# Patient Record
Sex: FEMALE | Race: WHITE | NOT HISPANIC OR LATINO | Employment: FULL TIME | URBAN - METROPOLITAN AREA
[De-identification: names, ages, dates, MRNs, and addresses within clinical notes are randomized per-mention and may not be internally consistent; named-entity substitution may affect disease eponyms.]

---

## 2017-02-27 ENCOUNTER — OFFICE VISIT (OUTPATIENT)
Dept: OBSTETRICS AND GYNECOLOGY | Facility: CLINIC | Age: 52
End: 2017-02-27
Payer: MEDICAID

## 2017-02-27 VITALS
WEIGHT: 216.5 LBS | SYSTOLIC BLOOD PRESSURE: 114 MMHG | DIASTOLIC BLOOD PRESSURE: 78 MMHG | HEIGHT: 64 IN | BODY MASS INDEX: 36.96 KG/M2

## 2017-02-27 DIAGNOSIS — Z00.00 PREVENTATIVE HEALTH CARE: Primary | ICD-10-CM

## 2017-02-27 DIAGNOSIS — Z01.419 VISIT FOR GYNECOLOGIC EXAMINATION: ICD-10-CM

## 2017-02-27 PROCEDURE — 99213 OFFICE O/P EST LOW 20 MIN: CPT | Mod: PBBFAC | Performed by: NURSE PRACTITIONER

## 2017-02-27 PROCEDURE — 88175 CYTOPATH C/V AUTO FLUID REDO: CPT

## 2017-02-27 PROCEDURE — 99999 PR PBB SHADOW E&M-EST. PATIENT-LVL III: CPT | Mod: PBBFAC,,, | Performed by: NURSE PRACTITIONER

## 2017-02-27 PROCEDURE — 99203 OFFICE O/P NEW LOW 30 MIN: CPT | Mod: S$PBB,,, | Performed by: NURSE PRACTITIONER

## 2017-02-27 RX ORDER — LISINOPRIL 20 MG/1
TABLET ORAL
COMMUNITY
Start: 2013-09-04 | End: 2018-09-07

## 2017-02-27 RX ORDER — QUETIAPINE FUMARATE 400 MG/1
50 TABLET, FILM COATED ORAL NIGHTLY
COMMUNITY
End: 2017-11-01 | Stop reason: DRUGHIGH

## 2017-02-27 RX ORDER — SIMVASTATIN 20 MG/1
TABLET, FILM COATED ORAL
COMMUNITY
Start: 2014-11-10 | End: 2017-07-12 | Stop reason: CLARIF

## 2017-02-27 NOTE — PROGRESS NOTES
"CC: Well woman exam    Thiago Rincon is a 51 y.o. female  presents for well woman exam.  LMP: Patient's last menstrual period was 2017 (exact date)..  No issues, problems, or complaints.Can not recall last pap exam. Cycles are every 26- 28 days and not heavy. Last mammogram this month, normal.     Past Medical History:   Diagnosis Date    Abnormal Pap smear of cervix     Depression     History of suicidal tendencies     Mood disorder     Psoriasis      Past Surgical History:   Procedure Laterality Date    CERVICAL CONIZATION   W/ LASER       Social History     Social History    Marital status:      Spouse name: N/A    Number of children: N/A    Years of education: N/A     Occupational History    Not on file.     Social History Main Topics    Smoking status: Current Every Day Smoker     Packs/day: 0.50     Types: Cigarettes    Smokeless tobacco: Never Used    Alcohol use Yes      Comment: chico d/t pt status    Drug use: No    Sexual activity: No     Other Topics Concern    Not on file     Social History Narrative     Family History   Problem Relation Age of Onset    Hypertension Father     Colon cancer Mother     Hypertension Mother     Breast cancer Neg Hx     Cancer Neg Hx     Ovarian cancer Neg Hx      OB History      Para Term  AB TAB SAB Ectopic Multiple Living    0 0 0 0 0 0 0 0 0 0          /78  Ht 5' 4" (1.626 m)  Wt 98.2 kg (216 lb 7.9 oz)  LMP 2017 (Exact Date)  BMI 37.16 kg/m2      ROS:  GENERAL: Denies weight gain or weight loss. Feeling well overall.   SKIN: Denies rash or lesions.   HEAD: Denies head injury or headache.   NODES: Denies enlarged lymph nodes.   CHEST: Denies chest pain or shortness of breath.   CARDIOVASCULAR: Denies palpitations or left sided chest pain.   ABDOMEN: No abdominal pain, constipation, diarrhea, nausea, vomiting or rectal bleeding.   URINARY: No frequency, dysuria, hematuria, or burning on " "urination.  REPRODUCTIVE: See HPI.   BREASTS: The patient performs breast self-examination and denies pain, lumps, or nipple discharge.   HEMATOLOGIC: No easy bruisability or excessive bleeding.   MUSCULOSKELETAL: Denies joint pain or swelling.   NEUROLOGIC: Denies syncope or weakness.   PSYCHIATRIC: Denies depression, anxiety or mood swings.    PHYSICAL EXAM:  APPEARANCE: Obese female, in no acute distress.  AFFECT: WNL, alert and oriented x 3  SKIN: No acne or hirsutism  NECK: Neck symmetric without masses or thyromegaly  NODES: No inguinal, cervical, axillary, or femoral lymph node enlargement  CHEST: Good respiratory effect  ABDOMEN: Soft.  No tenderness or masses.  No hepatosplenomegaly.  No hernias.  BREASTS: Symmetrical, no skin changes or visible lesions.  No palpable masses, nipple discharge bilaterally.  PELVIC: Normal external genitalia without lesions.  Normal hair distribution.  Adequate perineal body, normal urethral meatus.  Vagina moist and well rugated without lesions or discharge.  Cervix pink, without lesions, discharge or tenderness.  No significant cystocele or rectocele.  Bimanual exam shows uterus to be normal size, regular, mobile and nontender.  Adnexa without masses or tenderness.    EXTREMITIES: No edema.    1. Preventative health care  Liquid-based pap smear, screening   2. Visit for gynecologic examination  Liquid-based pap smear, screening    PLAN:  Pap exam  Wants to make " her own appt for colonoscopy ".   Patient was counseled today on A.C.S. Pap guidelines and recommendations for yearly pelvic exams, mammograms and monthly self breast exams; to see her PCP for other health maintenance.                   "

## 2017-03-06 ENCOUNTER — TELEPHONE (OUTPATIENT)
Dept: OBSTETRICS AND GYNECOLOGY | Facility: CLINIC | Age: 52
End: 2017-03-06

## 2017-03-06 NOTE — TELEPHONE ENCOUNTER
----- Message from Sylvia Durant NP sent at 3/6/2017  1:25 PM CST -----  Please call patient and inform normal pap exam results.

## 2017-07-12 ENCOUNTER — OFFICE VISIT (OUTPATIENT)
Dept: PODIATRY | Facility: CLINIC | Age: 52
End: 2017-07-12
Payer: COMMERCIAL

## 2017-07-12 VITALS
WEIGHT: 216 LBS | DIASTOLIC BLOOD PRESSURE: 81 MMHG | HEIGHT: 64 IN | SYSTOLIC BLOOD PRESSURE: 125 MMHG | HEART RATE: 81 BPM | BODY MASS INDEX: 36.88 KG/M2

## 2017-07-12 DIAGNOSIS — L60.0 INGROWN NAIL: Primary | ICD-10-CM

## 2017-07-12 DIAGNOSIS — E66.9 OBESITY, UNSPECIFIED OBESITY SEVERITY, UNSPECIFIED OBESITY TYPE: ICD-10-CM

## 2017-07-12 PROCEDURE — 99203 OFFICE O/P NEW LOW 30 MIN: CPT | Mod: S$GLB,,, | Performed by: PODIATRIST

## 2017-07-12 PROCEDURE — 99999 PR PBB SHADOW E&M-EST. PATIENT-LVL III: CPT | Mod: PBBFAC,,, | Performed by: PODIATRIST

## 2017-07-12 RX ORDER — SIMVASTATIN 10 MG/1
TABLET, FILM COATED ORAL NIGHTLY
COMMUNITY
End: 2017-11-01 | Stop reason: DRUGHIGH

## 2017-07-12 RX ORDER — AMOXICILLIN 500 MG/1
500 CAPSULE ORAL
COMMUNITY
End: 2017-08-14

## 2017-07-12 NOTE — PATIENT INSTRUCTIONS
Instructions for Care after Ingrown Nail removal    Dressing Options- Traditional Method:  1. Soaking two times a day in WARM water with Epsom salts or diluted Povidone Iodine  (Betadine) for 15-20 minutes. You will need to purchase these products from the pharmacy.    2. Dry toe then apply an antibiotic cream or ointment such as Neosporin or Polysporin plus or  Garamycin and cover with a 2 x 2 inch size gauze and then secure with a 1 inch band aid.    3. In the second week, take the dressing off at bedtime to air dry the toe.    4. If the toe is infected take the Antibiotic Pills as directed until finished.      Understanding Ingrown Toenails    An ingrown nail is the result of a nail growing into the skin that surrounds it. This often occurs at either edge of the big toe. Ingrown nails may be caused by improper trimming, inherited nail deformities, injuries, fungal infections, or pressure.  Symptoms  Ingrown nails may cause pain at the tip of the toe or all the way to the base of the toe. The pain is often worse while walking. An ingrown nail may also lead to infection, inflammation, or a more serious condition. If its infected, you might see pus or redness.  Evaluation  To determine the extent of your problem, your healthcare provider examines and possibly presses the painful area. If other problems are suspected, blood tests, cultures, and X-rays may be done as well.  Treatment  If the nail isnt infected, your healthcare provider may trim the corner of it to help relieve your symptoms. He or she may need to remove one side of your nail back to the cuticle. The base of the nail may then be treated with a chemical to keep the ingrown part from growing back. Severe infections or ingrown nails may require antibiotics and temporary or permanent removal of a portion of the nail. To prevent pain, a local anesthetic may be used in these procedures. This treatment is usually done at your healthcare provider's  office.  Prevention  Many nail problems can be prevented by wearing the right shoes and trimming your nails properly. To help avoid infection, keep your feet clean and dry. If you have diabetes, talk with your healthcare provider before doing any foot self-care.  · The right shoes: Get your feet measured (your size may change as you age). Wear shoes that are supportive and roomy enough for your toes to wiggle. Look for shoes made of natural materials such as leather, which allow your feet to breathe.  · Proper trimming: To avoid problems, trim your toenails straight across without cutting down into the corners. If you cant trim your own nails, ask your healthcare provider to do so for you.  Date Last Reviewed: 10/1/2016  © 8261-9298 [employer]. [address] [place] [address], [place] [address]. All rights reserved. This information is not intended as a substitute for professional medical care. Always follow your healthcare professional's instructions.

## 2017-07-12 NOTE — PROGRESS NOTES
Subjective:    Patient ID: Thiago Rincon is a 51 y.o. female.    Chief Complaint: Ingrown Toenail (left great toe / dr duron 6/17)      HPI:   Thiago is a 51 y.o. female who presents to the clinic complaining of painful ingrown toenail on the left foot.  HPI    Last Podiatry Enc: Visit date not found  Last Enc w/ Me: Visit date not found    Past Medical History:   Diagnosis Date    Abnormal Pap smear of cervix     Depression     History of suicidal tendencies     Mood disorder     Psoriasis      Past Surgical History:   Procedure Laterality Date    CERVICAL CONIZATION   W/ LASER  2007     Social History     Social History    Marital status:      Spouse name: N/A    Number of children: N/A    Years of education: N/A     Occupational History    Not on file.     Social History Main Topics    Smoking status: Current Every Day Smoker     Packs/day: 0.50     Types: Cigarettes    Smokeless tobacco: Never Used    Alcohol use Yes      Comment: chico d/t pt status    Drug use: No    Sexual activity: No     Other Topics Concern    Not on file     Social History Narrative    No narrative on file         Current Outpatient Prescriptions:     lisinopril (PRINIVIL,ZESTRIL) 20 MG tablet, TAKE 1 TABLET BY MOUTH EVERY DAY, Disp: , Rfl:     lithium carbonate 150 MG capsule, Take 300 mg by mouth 3 (three) times daily with meals. , Disp: , Rfl:     quetiapine (SEROQUEL) 400 MG tablet, Take 50 mg by mouth every evening., Disp: , Rfl:     simvastatin (ZOCOR) 20 MG tablet, , Disp: , Rfl:    Review of patient's allergies indicates:   Allergen Reactions    Lamictal [lamotrigine] Itching    Benadryl [diphenhydramine hcl] Rash       ROS  ROS Constitutional:  General Appearance: well nourished  Vascular: negative for cramps, edema and bruising  Musculoskeletal: negative for joint paint and joint edema  Skin: negative for rashes and lesions  Neurological: negative for burning, tingling and numbness  Gastrointestinal:  "negative for stomach pain, nausea and vomiting        Objective:        /81   Pulse 81   Ht 5' 4" (1.626 m)   Wt 98 kg (216 lb)   BMI 37.08 kg/m²     Ortho/SPM Exam  Physical Exam  LE exam con't:  V: DP 2/4, PT 2/4, CRT< 3s to all digits tested.     N: SILT in SP/DP/T/Michaela/Saph distributions.    Derm: Skin intact. No erythema, edema or ecchymosis. L hallux nail mildly ingrowing and incurvated. No signs of infection.    Ortho:  +Motor EHL/FHL/TA/GA   Compartments soft/compressible. No pain on passive stretch of big toe. No calf  pain.        Assessment:     Imaging / Labs:    No results found.            1. Ingrown nail - Left Foot    2. Obesity, unspecified obesity severity, unspecified obesity type          Plan:          Procedures  .   Thiago was seen today for ingrown toenail.    Diagnoses and all orders for this visit:    Ingrown nail - Left Foot    Obesity, unspecified obesity severity, unspecified obesity type        Thiago Rincon is a 51 y.o. female presenting w/   1. Ingrown nail - Left Foot    2. Obesity, unspecified obesity severity, unspecified obesity type        -pt seen, evaluated, and managed  -dx discussed in detail. All questions/concerns addressed  -all tx options discussed. All alternatives, risks, benefits of all txs discussed  -Pt was educated on weight loss  -rxs dispensed: none  -discussed ingrowing toenails and all tx options. Pt opts for non-procedural slantback which was performed as a courtesy w/o complication  -pt to perform epsom salt or betadine soaks once daily x 2wks. Written instructions dispensed  -keep toe covered with triple abx ointment + bandaid x 2wks  -will plan for procedural removal at nxt visit or if ingrown nails recur    rtc 4 wks for possible procedure: PNA w/o matrixectomy L hallux      Return in about 4 weeks (around 8/9/2017).          "

## 2017-07-19 ENCOUNTER — PATIENT MESSAGE (OUTPATIENT)
Dept: PODIATRY | Facility: CLINIC | Age: 52
End: 2017-07-19

## 2017-07-20 ENCOUNTER — TELEPHONE (OUTPATIENT)
Dept: PODIATRY | Facility: CLINIC | Age: 52
End: 2017-07-20

## 2017-07-20 NOTE — TELEPHONE ENCOUNTER
I spoke with ashkan velásquez , to let her know to continue soaking for another week and if she is having any problems to please call me back I gave her my name

## 2017-08-14 ENCOUNTER — OFFICE VISIT (OUTPATIENT)
Dept: PODIATRY | Facility: CLINIC | Age: 52
End: 2017-08-14
Payer: COMMERCIAL

## 2017-08-14 VITALS
HEIGHT: 64 IN | BODY MASS INDEX: 36.88 KG/M2 | DIASTOLIC BLOOD PRESSURE: 86 MMHG | WEIGHT: 216 LBS | SYSTOLIC BLOOD PRESSURE: 133 MMHG | HEART RATE: 78 BPM

## 2017-08-14 DIAGNOSIS — L60.0 INGROWN NAIL: Primary | ICD-10-CM

## 2017-08-14 PROCEDURE — 99999 PR PBB SHADOW E&M-EST. PATIENT-LVL III: CPT | Mod: PBBFAC,,, | Performed by: PODIATRIST

## 2017-08-14 PROCEDURE — 99213 OFFICE O/P EST LOW 20 MIN: CPT | Mod: S$GLB,,, | Performed by: PODIATRIST

## 2017-08-14 PROCEDURE — 3008F BODY MASS INDEX DOCD: CPT | Mod: S$GLB,,, | Performed by: PODIATRIST

## 2017-08-14 NOTE — PROGRESS NOTES
Subjective:    Patient ID: Thiago Rincon is a 51 y.o. female.    Chief Complaint: Ingrown Toenail (left great toenail/ dr duron 6- 17)      HPI:   Thiago is a 51 y.o. female who presents to the clinic complaining of painful ingrown toenail on the left foot.  Pt is 4 wks s/p slantback for ingrowing nail. Reports problem is resolved.        Last Podiatry Enc: Visit date not found  Last Enc w/ Me: Visit date not found    Past Medical History:   Diagnosis Date    Abnormal Pap smear of cervix     Depression     History of suicidal tendencies     Mood disorder     Psoriasis      Past Surgical History:   Procedure Laterality Date    CERVICAL CONIZATION   W/ LASER  2007     Social History     Social History    Marital status:      Spouse name: N/A    Number of children: N/A    Years of education: N/A     Occupational History    Not on file.     Social History Main Topics    Smoking status: Current Every Day Smoker     Packs/day: 0.50     Types: Cigarettes    Smokeless tobacco: Never Used    Alcohol use Yes      Comment: chico d/t pt status    Drug use: No    Sexual activity: No     Other Topics Concern    Not on file     Social History Narrative    No narrative on file         Current Outpatient Prescriptions:     lisinopril (PRINIVIL,ZESTRIL) 20 MG tablet, TAKE 1 TABLET BY MOUTH EVERY DAY, Disp: , Rfl:     lithium carbonate 150 MG capsule, Take 300 mg by mouth 3 (three) times daily with meals. , Disp: , Rfl:     quetiapine (SEROQUEL) 400 MG tablet, Take 50 mg by mouth every evening., Disp: , Rfl:     simvastatin (ZOCOR) 10 MG tablet, Take by mouth every evening., Disp: , Rfl:    Review of patient's allergies indicates:   Allergen Reactions    Lamictal [lamotrigine] Itching    Benadryl [diphenhydramine hcl] Rash       ROS  ROS Constitutional:  General Appearance: well nourished  Vascular: negative for cramps, edema and bruising  Musculoskeletal: negative for joint paint and joint edema  Skin:  "negative for rashes and lesions  Neurological: negative for burning, tingling and numbness  Gastrointestinal: negative for stomach pain, nausea and vomiting        Objective:        /86   Pulse 78   Ht 5' 4" (1.626 m)   Wt 98 kg (216 lb)   BMI 37.08 kg/m²     Ortho/SPM Exam  Physical Exam  LE exam con't:  V: DP 2/4, PT 2/4, CRT< 3s to all digits tested.     N: SILT in SP/DP/T/Michaela/Saph distributions.    Derm: Skin intact. No erythema, edema or ecchymosis. L hallux nail mildly ingrowing and incurvated. No signs of infection.    Ortho:  +Motor EHL/FHL/TA/GA   Compartments soft/compressible. No pain on passive stretch of big toe. No calf  pain.        Assessment:     Imaging / Labs:    No results found.            1. Ingrown nail          Plan:          Procedures  .   Thiago was seen today for ingrown toenail.    Diagnoses and all orders for this visit:    Ingrown nail        Thiago Rincon is a 51 y.o. female presenting w/   1. Ingrown nail        -pt seen, evaluated, and managed  -dx discussed in detail. All questions/concerns addressed  -all tx options discussed. All alternatives, risks, benefits of all txs discussed  -Pt was educated on weight loss  -rxs dispensed: none  -discussed ingrowing toenails and all tx options.   -discussed prevention  -problem resolved at this time  -will plan for procedural removal at nxt visit or if ingrown nails recur      Return in about 2 weeks (around 8/28/2017).          "

## 2017-08-14 NOTE — PATIENT INSTRUCTIONS
Instructions for Care after Ingrown Nail removal    Dressing Options- Traditional Method:  1. Soaking two times a day in WARM water with Epsom salts or diluted Povidone Iodine  (Betadine) for 15-20 minutes. You will need to purchase these products from the pharmacy.    2. Dry toe then apply an antibiotic cream or ointment such as Neosporin or Polysporin plus or  Garamycin and cover with a 2 x 2 inch size gauze and then secure with a 1 inch band aid.    3. In the second week, take the dressing off at bedtime to air dry the toe.    4. If the toe is infected take the Antibiotic Pills as directed until finished.      Understanding Ingrown Toenails    An ingrown nail is the result of a nail growing into the skin that surrounds it. This often occurs at either edge of the big toe. Ingrown nails may be caused by improper trimming, inherited nail deformities, injuries, fungal infections, or pressure.  Symptoms  Ingrown nails may cause pain at the tip of the toe or all the way to the base of the toe. The pain is often worse while walking. An ingrown nail may also lead to infection, inflammation, or a more serious condition. If its infected, you might see pus or redness.  Evaluation  To determine the extent of your problem, your healthcare provider examines and possibly presses the painful area. If other problems are suspected, blood tests, cultures, and X-rays may be done as well.  Treatment  If the nail isnt infected, your healthcare provider may trim the corner of it to help relieve your symptoms. He or she may need to remove one side of your nail back to the cuticle. The base of the nail may then be treated with a chemical to keep the ingrown part from growing back. Severe infections or ingrown nails may require antibiotics and temporary or permanent removal of a portion of the nail. To prevent pain, a local anesthetic may be used in these procedures. This treatment is usually done at your healthcare provider's  office.  Prevention  Many nail problems can be prevented by wearing the right shoes and trimming your nails properly. To help avoid infection, keep your feet clean and dry. If you have diabetes, talk with your healthcare provider before doing any foot self-care.  · The right shoes: Get your feet measured (your size may change as you age). Wear shoes that are supportive and roomy enough for your toes to wiggle. Look for shoes made of natural materials such as leather, which allow your feet to breathe.  · Proper trimming: To avoid problems, trim your toenails straight across without cutting down into the corners. If you cant trim your own nails, ask your healthcare provider to do so for you.  Date Last Reviewed: 10/1/2016  © 4844-9287 [employer], [facility]. [address] [place] [address], [place] [address]. All rights reserved. This information is not intended as a substitute for professional medical care. Always follow your healthcare professional's instructions.

## 2017-11-01 ENCOUNTER — LAB VISIT (OUTPATIENT)
Dept: LAB | Facility: HOSPITAL | Age: 52
End: 2017-11-01
Attending: INTERNAL MEDICINE
Payer: COMMERCIAL

## 2017-11-01 ENCOUNTER — OFFICE VISIT (OUTPATIENT)
Dept: INTERNAL MEDICINE | Facility: CLINIC | Age: 52
End: 2017-11-01
Payer: COMMERCIAL

## 2017-11-01 ENCOUNTER — PATIENT MESSAGE (OUTPATIENT)
Dept: INTERNAL MEDICINE | Facility: CLINIC | Age: 52
End: 2017-11-01

## 2017-11-01 VITALS
BODY MASS INDEX: 36.96 KG/M2 | HEART RATE: 80 BPM | DIASTOLIC BLOOD PRESSURE: 78 MMHG | RESPIRATION RATE: 16 BRPM | HEIGHT: 64 IN | SYSTOLIC BLOOD PRESSURE: 116 MMHG | WEIGHT: 216.5 LBS | TEMPERATURE: 98 F

## 2017-11-01 DIAGNOSIS — E78.2 MIXED HYPERLIPIDEMIA: ICD-10-CM

## 2017-11-01 DIAGNOSIS — Z00.00 ANNUAL PHYSICAL EXAM: Primary | ICD-10-CM

## 2017-11-01 DIAGNOSIS — Z12.11 COLON CANCER SCREENING: ICD-10-CM

## 2017-11-01 DIAGNOSIS — Z00.00 ANNUAL PHYSICAL EXAM: ICD-10-CM

## 2017-11-01 DIAGNOSIS — L40.9 PSORIASIS: ICD-10-CM

## 2017-11-01 DIAGNOSIS — I10 ESSENTIAL HYPERTENSION: ICD-10-CM

## 2017-11-01 DIAGNOSIS — H53.9 VISUAL CHANGES: ICD-10-CM

## 2017-11-01 DIAGNOSIS — Z72.0 TOBACCO ABUSE: ICD-10-CM

## 2017-11-01 DIAGNOSIS — H66.006 RECURRENT ACUTE SUPPURATIVE OTITIS MEDIA WITHOUT SPONTANEOUS RUPTURE OF TYMPANIC MEMBRANE OF BOTH SIDES: ICD-10-CM

## 2017-11-01 DIAGNOSIS — F31.9 BIPOLAR DEPRESSION: ICD-10-CM

## 2017-11-01 PROBLEM — H66.003 ACUTE SUPPURATIVE OTITIS MEDIA OF BOTH EARS WITHOUT SPONTANEOUS RUPTURE OF TYMPANIC MEMBRANES: Status: ACTIVE | Noted: 2017-11-01

## 2017-11-01 LAB
ALBUMIN SERPL BCP-MCNC: 3.6 G/DL
ALP SERPL-CCNC: 70 U/L
ALT SERPL W/O P-5'-P-CCNC: 12 U/L
ANION GAP SERPL CALC-SCNC: 7 MMOL/L
AST SERPL-CCNC: 19 U/L
BASOPHILS # BLD AUTO: 0.03 K/UL
BASOPHILS NFR BLD: 0.4 %
BILIRUB SERPL-MCNC: 0.3 MG/DL
BUN SERPL-MCNC: 15 MG/DL
CALCIUM SERPL-MCNC: 9.1 MG/DL
CHLORIDE SERPL-SCNC: 103 MMOL/L
CHOLEST SERPL-MCNC: 206 MG/DL
CHOLEST/HDLC SERPL: 3.7 {RATIO}
CO2 SERPL-SCNC: 26 MMOL/L
CREAT SERPL-MCNC: 0.8 MG/DL
DIFFERENTIAL METHOD: NORMAL
EOSINOPHIL # BLD AUTO: 0.2 K/UL
EOSINOPHIL NFR BLD: 3 %
ERYTHROCYTE [DISTWIDTH] IN BLOOD BY AUTOMATED COUNT: 13.2 %
EST. GFR  (AFRICAN AMERICAN): >60 ML/MIN/1.73 M^2
EST. GFR  (NON AFRICAN AMERICAN): >60 ML/MIN/1.73 M^2
ESTIMATED AVG GLUCOSE: 105 MG/DL
GLUCOSE SERPL-MCNC: 96 MG/DL
HBA1C MFR BLD HPLC: 5.3 %
HCT VFR BLD AUTO: 39.7 %
HDLC SERPL-MCNC: 56 MG/DL
HDLC SERPL: 27.2 %
HGB BLD-MCNC: 12.7 G/DL
IMM GRANULOCYTES # BLD AUTO: 0 K/UL
IMM GRANULOCYTES NFR BLD AUTO: 0 %
LDLC SERPL CALC-MCNC: 134 MG/DL
LYMPHOCYTES # BLD AUTO: 2.5 K/UL
LYMPHOCYTES NFR BLD: 34.8 %
MCH RBC QN AUTO: 29.1 PG
MCHC RBC AUTO-ENTMCNC: 32 G/DL
MCV RBC AUTO: 91 FL
MONOCYTES # BLD AUTO: 0.6 K/UL
MONOCYTES NFR BLD: 7.9 %
NEUTROPHILS # BLD AUTO: 3.9 K/UL
NEUTROPHILS NFR BLD: 53.9 %
NONHDLC SERPL-MCNC: 150 MG/DL
NRBC BLD-RTO: 0 /100 WBC
PLATELET # BLD AUTO: 264 K/UL
PMV BLD AUTO: 11.3 FL
POTASSIUM SERPL-SCNC: 4.5 MMOL/L
PROT SERPL-MCNC: 7.5 G/DL
RBC # BLD AUTO: 4.37 M/UL
SODIUM SERPL-SCNC: 136 MMOL/L
TRIGL SERPL-MCNC: 80 MG/DL
TSH SERPL DL<=0.005 MIU/L-ACNC: 1.5 UIU/ML
WBC # BLD AUTO: 7.25 K/UL

## 2017-11-01 PROCEDURE — 90471 IMMUNIZATION ADMIN: CPT | Mod: S$GLB,,, | Performed by: INTERNAL MEDICINE

## 2017-11-01 PROCEDURE — 99386 PREV VISIT NEW AGE 40-64: CPT | Mod: 25,S$GLB,, | Performed by: INTERNAL MEDICINE

## 2017-11-01 PROCEDURE — 80061 LIPID PANEL: CPT

## 2017-11-01 PROCEDURE — 84443 ASSAY THYROID STIM HORMONE: CPT

## 2017-11-01 PROCEDURE — 80053 COMPREHEN METABOLIC PANEL: CPT

## 2017-11-01 PROCEDURE — 85025 COMPLETE CBC W/AUTO DIFF WBC: CPT

## 2017-11-01 PROCEDURE — 99999 PR PBB SHADOW E&M-EST. PATIENT-LVL V: CPT | Mod: PBBFAC,,, | Performed by: INTERNAL MEDICINE

## 2017-11-01 PROCEDURE — 36415 COLL VENOUS BLD VENIPUNCTURE: CPT | Mod: PO

## 2017-11-01 PROCEDURE — 83036 HEMOGLOBIN GLYCOSYLATED A1C: CPT

## 2017-11-01 PROCEDURE — 90732 PPSV23 VACC 2 YRS+ SUBQ/IM: CPT | Mod: S$GLB,,, | Performed by: INTERNAL MEDICINE

## 2017-11-01 RX ORDER — SIMVASTATIN 20 MG/1
20 TABLET, FILM COATED ORAL DAILY
Refills: 5 | COMMUNITY
Start: 2017-10-19 | End: 2022-01-18 | Stop reason: ALTCHOICE

## 2017-11-01 RX ORDER — QUETIAPINE FUMARATE 50 MG/1
50 TABLET, FILM COATED ORAL NIGHTLY
Refills: 5 | Status: ON HOLD | COMMUNITY
Start: 2017-10-19 | End: 2018-10-17

## 2017-11-01 RX ORDER — LITHIUM CARBONATE 300 MG
300 TABLET ORAL DAILY
Refills: 5 | Status: ON HOLD | COMMUNITY
Start: 2017-10-19 | End: 2018-10-25 | Stop reason: HOSPADM

## 2017-11-01 NOTE — PROGRESS NOTES
Subjective:       Patient ID: Thiago Rincon is a 51 y.o. female.    Chief Complaint: Annual Exam and Establish Care    HPI  51 y.o. female here for annual exam.     Lipid disorders/ASCVD risk (ages >/= 45 or >/= 20 if increased risk ): due    DM (>45y yearly or if obese, HTN): A1c due  Sexual Screening: not sexually active  Eye exam: due  Breast Cancer (40-50y discretion of pt, 50-74y every 1-2 years): Mammogram 03/2017 - normal   Cervical Cancer (Pap Smear ages 21-65 every 3 years or Pap + HPV q5 years after 30 years of age):  03/2017 - normal  Colorectal Cancer (normal risk 50-75yr): Colonoscopy due     Vaccines:   Influenza (yearly) utd   Tetanus (every 10 yrs - 1st tdap) pt unsure but thinks utd    PPSV23(>66yo or <65 w/ lung dz, smoking, DM) due         Pt presents today requesting lab work. She follows with uncle who is a Family Medicine Physician in [place], but was unable to obtain lab work there. She wishes to have her thyroid checked as problems run in her family. She is utd on cervical cancer screening and mammogram. 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 She follows with a Psychiatrist in [place] for her history of bipolar depression.     Past Medical History:   Diagnosis Date    Abnormal Pap smear of cervix     Depression     History of suicidal tendencies     Mood disorder     Psoriasis      Past Surgical History:   Procedure Laterality Date    CERVICAL CONIZATION   W/ LASER  2007     Family History   Problem Relation Age of Onset    Hypertension Father     Colon cancer Mother     Hypertension Mother     Colon cancer Maternal Aunt     Breast cancer Neg Hx     Cancer Neg Hx     Ovarian cancer Neg Hx      Social History     Social History    Marital status:      Spouse name: N/A    Number of children: N/A    Years of education: N/A     Occupational History    Not on file.     Social History Main Topics    Smoking status: Current Every Day  Smoker     Packs/day: 0.25     Years: 5.00     Types: Cigarettes    Smokeless tobacco: Never Used    Alcohol use Yes      Comment: one glass per year    Drug use: No    Sexual activity: No     Other Topics Concern    Not on file     Social History Narrative    RT at [employer]. Sees family med in [place]. Psychiatrist in [place].     Review of patient's allergies indicates:   Allergen Reactions    Lamictal [lamotrigine] Itching    Benadryl [diphenhydramine hcl] Rash       Current Outpatient Prescriptions:     lisinopril (PRINIVIL,ZESTRIL) 20 MG tablet, TAKE 1 TABLET BY MOUTH EVERY DAY, Disp: , Rfl:     lithium (LITHOTAB) 300 mg tablet, Take 300 mg by mouth once daily., Disp: , Rfl: 5    QUEtiapine (SEROQUEL) 50 MG tablet, Take 50 mg by mouth nightly., Disp: , Rfl: 5    simvastatin (ZOCOR) 20 MG tablet, Take 20 mg by mouth once daily., Disp: , Rfl: 5          Review of Systems   Constitutional: Negative for fatigue and unexpected weight change.   HENT: Positive for ear pain. Negative for sinus pain and trouble swallowing.    Eyes: Positive for visual disturbance. Negative for discharge and redness.   Respiratory: Negative for cough and shortness of breath.    Cardiovascular: Negative for chest pain and leg swelling.   Gastrointestinal: Negative for abdominal pain, blood in stool, constipation and diarrhea.   Genitourinary: Negative for difficulty urinating, dysuria and frequency.   Musculoskeletal: Negative for gait problem and joint swelling.   Skin: Positive for rash (psoriasis). Negative for pallor and wound.   Neurological: Positive for dizziness. Negative for numbness and headaches.       Objective:        Vitals:    11/01/17 0815   BP: 116/78   Pulse: 80   Resp: 16   Temp: 98.4 °F (36.9 °C)     Body mass index is 37.16 kg/m².    Physical Exam   Constitutional: She is oriented to person, place, and time. She appears well-developed. No distress.   HENT:   Head: Normocephalic and atraumatic.   Right Ear:  Tympanic membrane normal.   Left Ear: Tympanic membrane normal.   Nose: Nose normal.   Mouth/Throat: Oropharynx is clear and moist.   Psoriasis on bilateral external ears   Eyes: Conjunctivae and EOM are normal. Pupils are equal, round, and reactive to light.   Neck: Normal range of motion. Neck supple. No tracheal deviation present. No thyromegaly present.   Cardiovascular: Normal rate, regular rhythm, normal heart sounds and intact distal pulses.    Pulmonary/Chest: Effort normal and breath sounds normal.   Abdominal: Soft. She exhibits no distension and no mass. There is no tenderness.   Musculoskeletal: Normal range of motion. She exhibits no edema.   Lymphadenopathy:     She has no cervical adenopathy.   Neurological: She is alert and oriented to person, place, and time. No sensory deficit.   Skin: Skin is warm and dry. Rash (psoriasis along bilateral shins) noted. She is not diaphoretic. No cyanosis. Nails show no clubbing.   Psychiatric: She has a normal mood and affect. Her behavior is normal. Judgment normal. Her speech is tangential. She is inattentive.       Assessment:     1. Annual physical exam    2. Psoriasis    3. Recurrent acute suppurative otitis media without spontaneous rupture of tympanic membrane of both sides    4. Visual changes    5. Essential hypertension    6. Mixed hyperlipidemia    7. Bipolar depression    8. Colon cancer screening    9. Tobacco abuse           Plan:         1. Annual physical exam  - PPSV23 today. Influenza vaccine utd.  - CBC auto differential; Future  - Comprehensive metabolic panel; Future  - Hemoglobin A1c; Future  - Lipid panel; Future  - TSH; Future  - Urinalysis; Future  - Ambulatory Referral to Optometry  - Case request GI: COLONOSCOPY    2. Psoriasis  - continue current emolliants. Pt not interested in seeing dermatology    3. Recurrent acute suppurative otitis media without spontaneous rupture of tympanic membrane of both sides  - Ambulatory Referral to  ENT    4. Visual changes  - Ambulatory Referral to Optometry    5. Essential hypertension  - continue lisinopril 20mg daily    6. Mixed hyperlipidemia  - continue simvastatin    7. Bipolar depression  - continue management per psychiatry    8. Colon cancer screening  - Case request GI: COLONOSCOPY    9. Tobacco abuse  - Pneumococcal Polysaccharide Vaccine (23 Valent) (SQ/IM)  - encouraged cessation

## 2017-12-26 ENCOUNTER — HOSPITAL ENCOUNTER (OUTPATIENT)
Dept: PREADMISSION TESTING | Facility: HOSPITAL | Age: 52
Discharge: HOME | End: 2017-12-26
Attending: INTERNAL MEDICINE
Payer: COMMERCIAL

## 2017-12-26 ENCOUNTER — ANESTHESIA EVENT (OUTPATIENT)
Dept: ENDOSCOPY | Facility: HOSPITAL | Age: 52
End: 2017-12-26
Payer: COMMERCIAL

## 2017-12-26 VITALS — WEIGHT: 215 LBS | HEIGHT: 64 IN | BODY MASS INDEX: 36.7 KG/M2

## 2017-12-26 DIAGNOSIS — Z12.11 COLON CANCER SCREENING: Primary | ICD-10-CM

## 2017-12-26 RX ORDER — CEPHALEXIN 500 MG/1
500 CAPSULE ORAL 4 TIMES DAILY
Status: ON HOLD | COMMUNITY
End: 2018-10-17

## 2017-12-26 RX ORDER — SODIUM, POTASSIUM,MAG SULFATES 17.5-3.13G
1 SOLUTION, RECONSTITUTED, ORAL ORAL ONCE
Qty: 2 BOTTLE | Refills: 0 | Status: ON HOLD | OUTPATIENT
Start: 2017-12-27 | End: 2017-12-28 | Stop reason: HOSPADM

## 2017-12-26 RX ORDER — BISACODYL 5 MG
20 TABLET, DELAYED RELEASE (ENTERIC COATED) ORAL ONCE
Qty: 4 TABLET | Refills: 0 | Status: ON HOLD | OUTPATIENT
Start: 2017-12-27 | End: 2017-12-28 | Stop reason: HOSPADM

## 2017-12-26 NOTE — ANESTHESIA PREPROCEDURE EVALUATION
12/26/2017  Thiago Rincon is a 52 y.o., female.    Anesthesia Evaluation    I have reviewed the Patient Summary Reports.    I have reviewed the Nursing Notes.   I have reviewed the Medications.     Review of Systems  Anesthesia Hx:  No problems with previous Anesthesia    Social:  No Alcohol Use, Smoker    Hematology/Oncology:  Hematology Normal   Oncology Normal     EENT/Dental:EENT/Dental Normal   Cardiovascular:   Exercise tolerance: good Hypertension, well controlled    Pulmonary:  Pulmonary Normal    Renal/:  Renal/ Normal     Hepatic/GI:  Hepatic/GI Normal    Musculoskeletal:  Musculoskeletal Normal    Neurological:  Neurology Normal    Endocrine:  Endocrine Normal    Dermatological:  Skin Normal    Psych:   Psychiatric History          Physical Exam  General:  Obesity    Airway/Jaw/Neck:  Airway Findings: Mouth Opening: Normal Tongue: Normal  General Airway Assessment: Adult  Mallampati: II  TM Distance: Normal, at least 6 cm      Dental:  Dental Findings: In tact             Anesthesia Plan  Type of Anesthesia, risks & benefits discussed:  Anesthesia Type:  general  Patient's Preference:   Intra-op Monitoring Plan: standard ASA monitors  Intra-op Monitoring Plan Comments:   Post Op Pain Control Plan:   Post Op Pain Control Plan Comments:   Induction:   IV  Beta Blocker:  Patient is not currently on a Beta-Blocker (No further documentation required).       Informed Consent: Patient understands risks and agrees with Anesthesia plan.  Questions answered. Anesthesia consent signed with patient.  ASA Score: 2     Day of Surgery Review of History & Physical: I have interviewed and examined the patient. I have reviewed the patient's H&P dated: 12/28/17. There are no significant changes.  H&P update referred to the surgeon.         Ready For Surgery From Anesthesia Perspective.

## 2017-12-28 ENCOUNTER — TELEPHONE (OUTPATIENT)
Dept: SURGERY | Facility: CLINIC | Age: 52
End: 2017-12-28

## 2017-12-28 ENCOUNTER — HOSPITAL ENCOUNTER (OUTPATIENT)
Facility: HOSPITAL | Age: 52
Discharge: HOME | End: 2017-12-28
Attending: COLON & RECTAL SURGERY | Admitting: COLON & RECTAL SURGERY
Payer: COMMERCIAL

## 2017-12-28 ENCOUNTER — ANESTHESIA (OUTPATIENT)
Dept: ENDOSCOPY | Facility: HOSPITAL | Age: 52
End: 2017-12-28
Payer: COMMERCIAL

## 2017-12-28 ENCOUNTER — SURGERY (OUTPATIENT)
Age: 52
End: 2017-12-28

## 2017-12-28 DIAGNOSIS — Z12.11 COLON CANCER SCREENING: ICD-10-CM

## 2017-12-28 PROCEDURE — 45380 COLONOSCOPY AND BIOPSY: CPT | Performed by: COLON & RECTAL SURGERY

## 2017-12-28 PROCEDURE — 27201089 HC SNARE, DISP (ANY): Performed by: COLON & RECTAL SURGERY

## 2017-12-28 PROCEDURE — 88305 TISSUE EXAM BY PATHOLOGIST: CPT | Performed by: PATHOLOGY

## 2017-12-28 PROCEDURE — 37000008 HC ANESTHESIA 1ST 15 MINUTES: Performed by: COLON & RECTAL SURGERY

## 2017-12-28 PROCEDURE — 27201012 HC FORCEPS, HOT/COLD, DISP: Performed by: COLON & RECTAL SURGERY

## 2017-12-28 PROCEDURE — 45385 COLONOSCOPY W/LESION REMOVAL: CPT | Mod: 33,,, | Performed by: COLON & RECTAL SURGERY

## 2017-12-28 PROCEDURE — 25000003 PHARM REV CODE 250: Performed by: COLON & RECTAL SURGERY

## 2017-12-28 PROCEDURE — 45380 COLONOSCOPY AND BIOPSY: CPT | Mod: 59,,, | Performed by: COLON & RECTAL SURGERY

## 2017-12-28 PROCEDURE — 25000003 PHARM REV CODE 250: Performed by: NURSE ANESTHETIST, CERTIFIED REGISTERED

## 2017-12-28 PROCEDURE — 00810 *PRA ANESTH,INTESTINE,SCOPE,LOW: CPT | Mod: QZ,P2 | Performed by: NURSE ANESTHETIST, CERTIFIED REGISTERED

## 2017-12-28 PROCEDURE — 45385 COLONOSCOPY W/LESION REMOVAL: CPT | Performed by: COLON & RECTAL SURGERY

## 2017-12-28 PROCEDURE — 88305 TISSUE EXAM BY PATHOLOGIST: CPT | Mod: 26,,, | Performed by: PATHOLOGY

## 2017-12-28 PROCEDURE — 63600175 PHARM REV CODE 636 W HCPCS: Performed by: NURSE ANESTHETIST, CERTIFIED REGISTERED

## 2017-12-28 PROCEDURE — 37000009 HC ANESTHESIA EA ADD 15 MINS: Performed by: COLON & RECTAL SURGERY

## 2017-12-28 RX ORDER — PROPOFOL 10 MG/ML
VIAL (ML) INTRAVENOUS
Status: DISCONTINUED | OUTPATIENT
Start: 2017-12-28 | End: 2017-12-28

## 2017-12-28 RX ORDER — LIDOCAINE HYDROCHLORIDE 20 MG/ML
INJECTION, SOLUTION EPIDURAL; INFILTRATION; INTRACAUDAL; PERINEURAL
Status: DISCONTINUED | OUTPATIENT
Start: 2017-12-28 | End: 2017-12-28

## 2017-12-28 RX ORDER — PROPOFOL 10 MG/ML
VIAL (ML) INTRAVENOUS CONTINUOUS PRN
Status: DISCONTINUED | OUTPATIENT
Start: 2017-12-28 | End: 2017-12-28

## 2017-12-28 RX ORDER — SODIUM CHLORIDE 9 MG/ML
INJECTION, SOLUTION INTRAVENOUS CONTINUOUS
Status: DISCONTINUED | OUTPATIENT
Start: 2017-12-28 | End: 2017-12-28 | Stop reason: HOSPADM

## 2017-12-28 RX ADMIN — PROPOFOL 175 MCG/KG/MIN: 10 INJECTION, EMULSION INTRAVENOUS at 09:12

## 2017-12-28 RX ADMIN — PROPOFOL 120 MG: 10 INJECTION, EMULSION INTRAVENOUS at 09:12

## 2017-12-28 RX ADMIN — SODIUM CHLORIDE 500 ML: 0.9 INJECTION, SOLUTION INTRAVENOUS at 08:12

## 2017-12-28 RX ADMIN — LIDOCAINE HYDROCHLORIDE 50 MG: 20 INJECTION, SOLUTION EPIDURAL; INFILTRATION; INTRACAUDAL; PERINEURAL at 09:12

## 2017-12-28 NOTE — H&P
Procedure : Colonoscopy    Indication(s):  Family history of colon cancer (mother)    Review of patient's allergies indicates:   Allergen Reactions    Lamictal [lamotrigine] Itching    Benadryl [diphenhydramine hcl] Rash       Past Medical History:   Diagnosis Date    Abnormal Pap smear of cervix     Depression     History of suicidal tendencies     Mood disorder     Psoriasis        Prior to Admission medications    Medication Sig Start Date End Date Taking? Authorizing Provider   bisacodyl (DULCOLAX) 5 mg EC tablet Take 4 tablets (20 mg total) by mouth once. 12/27/17 12/27/17  Renea Dumont MD   cephALEXin (KEFLEX) 500 MG capsule Take 500 mg by mouth 4 (four) times daily.    Historical Provider, MD   lisinopril (PRINIVIL,ZESTRIL) 20 MG tablet TAKE 1 TABLET BY MOUTH EVERY DAY 9/4/13   Historical Provider, MD   lithium (LITHOTAB) 300 mg tablet Take 300 mg by mouth once daily. 10/19/17   Historical Provider, MD   QUEtiapine (SEROQUEL) 50 MG tablet Take 50 mg by mouth nightly. 10/19/17   Historical Provider, MD   simvastatin (ZOCOR) 20 MG tablet Take 20 mg by mouth once daily. 10/19/17   Historical Provider, MD   sodium,potassium,mag sulfates (SUPREP BOWEL PREP KIT) 17.5-3.13-1.6 gram SolR Take 1 kit by mouth once. 12/27/17 12/27/17  Renea Dumont MD       Sedation Problems: NO    Family History   Problem Relation Age of Onset    Hypertension Father     Colon cancer Mother     Hypertension Mother     Colon cancer Maternal Aunt     Breast cancer Neg Hx     Cancer Neg Hx     Ovarian cancer Neg Hx        Fam Hx of Sedation Problems: NO    Social History     Social History    Marital status:      Spouse name: N/A    Number of children: N/A    Years of education: N/A     Occupational History    Not on file.     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 Sees family med in [place]. Psychiatrist in [place].       Review of Systems -     Respiratory ROS: no cough, shortness of breath, or wheezing  Cardiovascular ROS: no chest pain or dyspnea on exertion  Gastrointestinal ROS: no abdominal pain, change in bowel habits, or black or bloody stools  Musculoskeletal ROS: negative  Neurological ROS: no TIA or stroke symptoms        Physical Exam:  General: no distress  Head: normocephalic  Airway:  normal oropharynx, airway normal  Neck: supple, symmetrical, trachea midline  Lungs:  normal respiratory effort  Heart: regular rate and rhythm  Abdomen: soft, non-tender non-distented; bowel sounds normal; no masses,  no organomegaly  Extremities: no cyanosis or edema, or clubbing       Deep Sedation: Mallampati Score per anesthesia     SedationPlan :Moderate     ASA : II    Patient is medically cleared for anesthesia.    Anesthesia/Surgery risks, benefits and alternative options discussed and understood by patient/family.

## 2017-12-28 NOTE — ANESTHESIA POSTPROCEDURE EVALUATION
Anesthesia Post Evaluation    Patient: Thiago Rincon    Procedure(s) Performed: Procedure(s) (LRB):  COLONOSCOPY (N/A)    Final Anesthesia Type: general  Patient location during evaluation: PACU  Patient participation: Yes- Able to Participate  Level of consciousness: awake and alert and oriented  Post-procedure vital signs: reviewed and stable  Pain management: adequate  Airway patency: patent  PONV status at discharge: No PONV  Anesthetic complications: no      Cardiovascular status: blood pressure returned to baseline and hemodynamically stable  Respiratory status: unassisted, spontaneous ventilation and room air  Hydration status: euvolemic  Follow-up not needed.        Visit Vitals  BP (!) 110/55 (BP Location: Left arm, Patient Position: Lying)   Pulse 78   Temp 36.1 °C (97 °F)   Resp 18   LMP 12/20/2017 (Within Days)   SpO2 97%   Breastfeeding? No       Pain/Humberto Score: Pain Assessment Performed: Yes (12/28/2017  9:54 AM)  Presence of Pain: denies (12/28/2017  9:54 AM)  Humberto Score: 10 (12/28/2017  9:54 AM)

## 2017-12-28 NOTE — OR NURSING
Pt refused to stay c/o cramping  Encouraged ambulation  Explained steps to reduce gas cramping pains   Wants to go home and eat.  Does not want to stay!

## 2017-12-28 NOTE — TRANSFER OF CARE
Anesthesia Transfer of Care Note    Patient: Thiago Rincon    Procedure(s) Performed: Procedure(s) (LRB):  COLONOSCOPY (N/A)    Patient location: PACU    Anesthesia Type: general    Transport from OR: Transported from OR on 6-10 L/min O2 by face mask with adequate spontaneous ventilation    Post pain: adequate analgesia    Post assessment: no apparent anesthetic complications and tolerated procedure well    Post vital signs: stable    Level of consciousness: sedated    Nausea/Vomiting: no nausea/vomiting    Complications: none    Transfer of care protocol was followed      Last vitals:   Visit Vitals  /78   Pulse 74   Temp 36.2 °C (97.2 °F)   Resp 16   LMP 12/20/2017 (Within Days)   Breastfeeding? No

## 2017-12-28 NOTE — OP NOTE
Patient Name: Thiago Rincon   Procedure Date: 2017  MRN: [id number]  : 1965  Age: 52 y.o.  Gender: female   Referring Physician :  Renea Dumont MD  Plan for Procedure: Monitored anaesthesia care  Indication: family history of colon cancer (mother, MGM, mat aunt)  Procedure:   Colonoscopy polypectomy cold forceps and Colonoscopy polypectomy cold snare    Surgeon(s) and Role:     * Izaiah Bond MD - Primary    Complications: None     Medicines: monitored anesthesia care    Procedure:  Prior to the procedure, a History and Physical was performed, and patient medications, allergies and sensitivities were reviewed.  The patient's tolerance of previous anesthesia was reviewed. The risks and benefits of the procedure and the sedation options and risks were discussed with the patient.  All questions were answered and informed consent was obtained.    After I obtained informed consent, the scope was passed under direct vision.  Throughout the procedure, the patient's blood pressure, pulse, and oxygen saturations were monitored continuously.  The Olympus scope CF - [id number] was introduced through the anus and advanced to the cecum, identified by appendiceal orifice and ileocecal valve.  The colonoscopy was performed without difficulty.  The patient tolerated the procedure well.  The quality of the bowel preparation was good     Findings:  polyp(s) #1, 3 mm in size, located in the descending colon removed by cold biopsy and sent for pathology  #2, 6 mm in size, located in the sigmoid removed by cold snare and retrieved for pathology    EBL: none    Impression:  Two single benign appearing colon polyps, removed as above.  Extensive family hx of CRC    Recommendation:  Repeat exam: 3 years  Consider genetics consultation  Return to my office pn

## 2017-12-28 NOTE — DISCHARGE SUMMARY
Discharge Note  Short Stay      SUMMARY     Admit Date: 12/28/2017    Attending Physician: Izaiah Bond MD     Discharge Physician: Izaiah Bond MD    Discharge Date: 12/28/2017 10:02 AM    Admission Diagnosis: family history of colon cancer (mom, MGM, mat aunt)    Final Diagnosis:  Colon polyos    Procedures Performed:    Colonoscopy polypectomy cold forceps and Colonoscopy polypectomy cold snare    Disposition: Home or Self Care    Condition at Discharge:  Stable    Patient Instructions:   Current Discharge Medication List      CONTINUE these medications which have NOT CHANGED    Details   cephALEXin (KEFLEX) 500 MG capsule Take 500 mg by mouth 4 (four) times daily.      lisinopril (PRINIVIL,ZESTRIL) 20 MG tablet TAKE 1 TABLET BY MOUTH EVERY DAY      lithium (LITHOTAB) 300 mg tablet Take 300 mg by mouth once daily.  Refills: 5      QUEtiapine (SEROQUEL) 50 MG tablet Take 50 mg by mouth nightly.  Refills: 5      simvastatin (ZOCOR) 20 MG tablet Take 20 mg by mouth once daily.  Refills: 5         STOP taking these medications       bisacodyl (DULCOLAX) 5 mg EC tablet Comments:   Reason for Stopping:         sodium,potassium,mag sulfates (SUPREP BOWEL PREP KIT) 17.5-3.13-1.6 gram SolR Comments:   Reason for Stopping:               Discharge Procedure Orders (must include Diet, Follow-up, Activity)    Discharge Procedure Orders (must include Diet, Follow-up, Activity)  Activity as tolerated          Repeat colonoscopy 3 years.

## 2017-12-28 NOTE — DISCHARGE INSTRUCTIONS
1.No driving today.  2.no heavy lifting or straining.  3. You may feel bloated due to air placed in your colon. You may experience some excessive gas and bloating.  4. A small amount of blood from rectum is not serious, notify Dr for any large amounts of rectal bleeding.  5. Go directly to ER if you notice any of the following:  -Chills, fever over 101 within 24 hours of your procedure  -Persistent vomiting assoc with blood  - Severe abdominal pain, other than gas cramps  -Severe chest pain  - black, tarry stools  - bright red bleeding from your rectum (greater than 1 tablespoon)    Call your Dr for any unusual pain, bleeding, or fever........[phone]

## 2018-01-04 VITALS
HEART RATE: 78 BPM | DIASTOLIC BLOOD PRESSURE: 55 MMHG | OXYGEN SATURATION: 97 % | RESPIRATION RATE: 18 BRPM | SYSTOLIC BLOOD PRESSURE: 110 MMHG | TEMPERATURE: 97 F

## 2018-01-08 ENCOUNTER — TELEPHONE (OUTPATIENT)
Dept: SURGERY | Facility: CLINIC | Age: 53
End: 2018-01-08

## 2018-01-08 NOTE — TELEPHONE ENCOUNTER
----- Message from Tonja Cody MA sent at 1/8/2018  3:49 PM CST -----  Contact:  Mobile: [phone]   Calling for the results of her colonoscopy      Mobile: [phone]

## 2018-01-08 NOTE — TELEPHONE ENCOUNTER
Spoke with pt and informed her pathology reveals 1 polyp was adenoma, other was lymphoid hyperplasia (not a true polyp).  Recommend repeat colonoscopy 3 yrs for surveillance due to strong family hx of CRC. Per Dr. Bond she should consider genetics evaluation due to family history. Pt is interested in getting genetic testing so I will contact her once referral is put in.

## 2018-01-11 DIAGNOSIS — Z80.0 FAMILY HISTORY OF COLON CANCER: Primary | ICD-10-CM

## 2018-03-20 ENCOUNTER — OFFICE VISIT (OUTPATIENT)
Dept: OTOLARYNGOLOGY | Facility: CLINIC | Age: 53
End: 2018-03-20
Payer: COMMERCIAL

## 2018-03-20 VITALS
TEMPERATURE: 99 F | BODY MASS INDEX: 36.88 KG/M2 | HEIGHT: 64 IN | SYSTOLIC BLOOD PRESSURE: 126 MMHG | DIASTOLIC BLOOD PRESSURE: 88 MMHG | HEART RATE: 60 BPM | WEIGHT: 216 LBS

## 2018-03-20 DIAGNOSIS — J03.90 TONSILLITIS: ICD-10-CM

## 2018-03-20 DIAGNOSIS — R53.83 FATIGUE, UNSPECIFIED TYPE: ICD-10-CM

## 2018-03-20 DIAGNOSIS — J02.9 SORE THROAT: Primary | ICD-10-CM

## 2018-03-20 PROCEDURE — 3079F DIAST BP 80-89 MM HG: CPT | Mod: CPTII,S$GLB,, | Performed by: NURSE PRACTITIONER

## 2018-03-20 PROCEDURE — 87070 CULTURE OTHR SPECIMN AEROBIC: CPT

## 2018-03-20 PROCEDURE — 87076 CULTURE ANAEROBE IDENT EACH: CPT | Mod: 59

## 2018-03-20 PROCEDURE — 87075 CULTR BACTERIA EXCEPT BLOOD: CPT

## 2018-03-20 PROCEDURE — 99999 PR PBB SHADOW E&M-EST. PATIENT-LVL III: CPT | Mod: PBBFAC,,, | Performed by: NURSE PRACTITIONER

## 2018-03-20 PROCEDURE — 3074F SYST BP LT 130 MM HG: CPT | Mod: CPTII,S$GLB,, | Performed by: NURSE PRACTITIONER

## 2018-03-20 PROCEDURE — 99203 OFFICE O/P NEW LOW 30 MIN: CPT | Mod: S$GLB,,, | Performed by: NURSE PRACTITIONER

## 2018-03-20 RX ORDER — CLINDAMYCIN HYDROCHLORIDE 300 MG/1
300 CAPSULE ORAL 3 TIMES DAILY
Qty: 30 CAPSULE | Refills: 0 | Status: SHIPPED | OUTPATIENT
Start: 2018-03-20 | End: 2018-03-20

## 2018-03-20 RX ORDER — METHYLPREDNISOLONE 4 MG/1
TABLET ORAL
Qty: 21 TABLET | Refills: 0 | Status: SHIPPED | OUTPATIENT
Start: 2018-03-20 | End: 2018-03-20

## 2018-03-20 RX ORDER — CLINDAMYCIN HYDROCHLORIDE 300 MG/1
300 CAPSULE ORAL 3 TIMES DAILY
Qty: 30 CAPSULE | Refills: 0 | Status: SHIPPED | OUTPATIENT
Start: 2018-03-20 | End: 2018-03-30

## 2018-03-20 RX ORDER — LEVALBUTEROL TARTRATE 45 UG/1
1-2 AEROSOL, METERED ORAL
COMMUNITY
End: 2018-08-13

## 2018-03-20 RX ORDER — METHYLPREDNISOLONE 4 MG/1
TABLET ORAL
Qty: 21 TABLET | Refills: 0 | Status: SHIPPED | OUTPATIENT
Start: 2018-03-20 | End: 2018-05-02

## 2018-03-20 NOTE — PATIENT INSTRUCTIONS
Discontinue previously prescribed Augmentin.  Monospot Lab draw.  Negative Rapid strep test.  Anaerobic and Aerobic culture sent to lab.  Clindamycin 300 mg po TID x 10 days.  Take an active culture yogurt or a Culturelle while taking the antibiotic.  Medrol dose pack.  Warm salt water gargles(1/2 teaspoon of salt to 1 cup warm water and gargle as desired).  Warm tea with honey.  Throat lozenges.  Fluids.  Rest.  OTC NSAID's prn.  Report to Emergency Department for severe or worsening symptoms.  F/U with PCP.  RTC prn.

## 2018-03-20 NOTE — PROGRESS NOTES
Subjective:       Patient ID: Thiago Rincon is a 52 y.o. female.    Chief Complaint: Sinus Problem; ALLERGY; and Sore Throat      She presents with a several week h/o a sore throat. Associated symptoms include fatigue and weakness. Symptoms are gradually improving. She reports a h/o a patients sputum getting in her eyes prior to the start of her symptoms.  Treatments tried include a course of Augmentin and steroid injections x2.  Sore Throat    This is a new problem. The current episode started 1 to 4 weeks ago. The problem has been gradually improving. There has been no fever. The patient is experiencing no pain. Pertinent negatives include no abdominal pain, congestion, coughing, diarrhea, drooling, ear discharge, ear pain, headaches, hoarse voice, plugged ear sensation, neck pain, shortness of breath, stridor, swollen glands, trouble swallowing or vomiting. She has had no exposure to strep or mono. Treatments tried: Course of Augmentin. Steroid injections x2. The treatment provided moderate relief.        Past Medical History: Patient has a past medical history of Abnormal Pap smear of cervix; Depression; History of suicidal tendencies; Mood disorder; and Psoriasis.    Past Surgical History: Patient has a past surgical history that includes Cervical conization w/ laser (2007); vocal cord nodules; and Colonoscopy (N/A, 12/28/2017).    Social History: Patient reports that she has been smoking Cigarettes.  She has a 1.25 pack-year smoking history. She has never used smokeless tobacco. She reports that she drinks alcohol. She reports that she does not use drugs.    Family History: family history includes Colon cancer in her maternal aunt and mother; Hypertension in her father and mother.    Medications:   Current Outpatient Prescriptions   Medication Sig    cephALEXin (KEFLEX) 500 MG capsule Take 500 mg by mouth 4 (four) times daily.    clindamycin (CLEOCIN) 300 MG capsule Take 1 capsule (300 mg total) by mouth 3  (three) times daily.    levalbuterol (XOPENEX HFA) 45 mcg/actuation inhaler Inhale 1-2 puffs into the lungs.    lisinopril (PRINIVIL,ZESTRIL) 20 MG tablet TAKE 1 TABLET BY MOUTH EVERY DAY    lithium (LITHOTAB) 300 mg tablet Take 300 mg by mouth once daily.    methylPREDNISolone (MEDROL DOSEPACK) 4 mg tablet use as directed    QUEtiapine (SEROQUEL) 50 MG tablet Take 50 mg by mouth nightly.    simvastatin (ZOCOR) 20 MG tablet Take 20 mg by mouth once daily.     No current facility-administered medications for this visit.        Allergies: Patient is allergic to augmentin [amoxicillin-pot clavulanate]; lamictal [lamotrigine]; and benadryl [diphenhydramine hcl].      Review of Systems   Constitutional: Positive for fatigue. Negative for activity change, appetite change, chills, diaphoresis, fever and unexpected weight change.   HENT: Positive for sore throat. Negative for congestion, dental problem, drooling, ear discharge, ear pain, facial swelling, hearing loss, hoarse voice, mouth sores, nosebleeds, postnasal drip, rhinorrhea, sinus pain, sinus pressure, sneezing, tinnitus, trouble swallowing and voice change.    Eyes: Negative for pain and visual disturbance.   Respiratory: Negative for cough, chest tightness, shortness of breath, wheezing and stridor.    Cardiovascular: Negative for chest pain.   Gastrointestinal: Negative for abdominal pain, diarrhea and vomiting.   Musculoskeletal: Negative for gait problem and neck pain.   Skin: Negative for color change and rash.   Allergic/Immunologic: Negative for environmental allergies.   Neurological: Negative for dizziness, seizures, syncope, facial asymmetry, speech difficulty, weakness, light-headedness, numbness and headaches.   Psychiatric/Behavioral: Negative for agitation and confusion. The patient is not nervous/anxious.        Objective:       /88 (BP Location: Right arm, Patient Position: Sitting, BP Method: Large (Automatic))   Pulse 60   Temp 98.5  "°F (36.9 °C) (Tympanic)   Ht 5' 4" (1.626 m)   Wt 98 kg (216 lb)   BMI 37.08 kg/m²     Physical Exam   Constitutional: She is oriented to person, place, and time. She appears well-developed and well-nourished.   HENT:   Head: Normocephalic and atraumatic. Not macrocephalic and not microcephalic. Head is without raccoon's eyes, without Cuba's sign, without abrasion, without contusion, without laceration, without right periorbital erythema and without left periorbital erythema. Hair is normal.   Right Ear: Tympanic membrane, external ear and ear canal normal. No lacerations. No drainage, swelling or tenderness. No foreign bodies. No mastoid tenderness. Tympanic membrane is not injected, not scarred, not perforated, not erythematous, not retracted and not bulging. Tympanic membrane mobility is normal. No middle ear effusion. No hemotympanum. No decreased hearing is noted.   Left Ear: Tympanic membrane, external ear and ear canal normal. No lacerations. No drainage, swelling or tenderness. No foreign bodies. No mastoid tenderness. Tympanic membrane is not injected, not scarred, not perforated, not erythematous, not retracted and not bulging. Tympanic membrane mobility is normal.  No middle ear effusion. No hemotympanum. No decreased hearing is noted.   Nose: Nose normal. No mucosal edema, rhinorrhea, nose lacerations, sinus tenderness, nasal deformity, septal deviation or nasal septal hematoma. No epistaxis.  No foreign bodies. Right sinus exhibits no maxillary sinus tenderness and no frontal sinus tenderness. Left sinus exhibits no maxillary sinus tenderness and no frontal sinus tenderness.   Mouth/Throat: Uvula is midline and mucous membranes are normal. Mucous membranes are not pale, not dry and not cyanotic. No oral lesions. No trismus in the jaw. No dental abscesses, uvula swelling or lacerations. Posterior oropharyngeal erythema (mild) present. No oropharyngeal exudate, posterior oropharyngeal edema or " tonsillar abscesses. Tonsils are 1+ on the right. Tonsils are 1+ on the left. No tonsillar exudate.   No AOM or OE.  No mastoid, tenderness, swelling, or redness.  Facial nerve intact.     Eyes: Conjunctivae, EOM and lids are normal. Pupils are equal, round, and reactive to light.   Neck: Trachea normal and normal range of motion. Neck supple. No spinous process tenderness and no muscular tenderness present. No neck rigidity. No edema, no erythema and normal range of motion present. No thyroid mass and no thyromegaly present.   Pulmonary/Chest: Effort normal.   Abdominal: Soft.   Musculoskeletal: Normal range of motion.   Lymphadenopathy:        Head (right side): No submental, no submandibular, no tonsillar, no preauricular and no posterior auricular adenopathy present.        Head (left side): No submental, no submandibular, no tonsillar, no preauricular, no posterior auricular and no occipital adenopathy present.     She has no cervical adenopathy.   Neurological: She is alert and oriented to person, place, and time. No cranial nerve deficit or sensory deficit.   Skin: Skin is warm and dry.   Psychiatric: She has a normal mood and affect. Her behavior is normal. Judgment and thought content normal.   Nursing note and vitals reviewed.      Assessment:       1. Sore throat    2. Tonsillitis    3. Fatigue, unspecified type        Plan:       Discontinue previously prescribed Augmentin.  Monospot Lab draw.  Negative Rapid strep test.  Anaerobic and Aerobic culture sent to lab.  Clindamycin 300 mg po TID x 10 days.  Take an active culture yogurt or a Culturelle while taking the antibiotic.  Medrol dose pack.  Warm salt water gargles(1/2 teaspoon of salt to 1 cup warm water and gargle as desired).  Warm tea with honey.  Throat lozenges.  Fluids.  Rest.  OTC NSAID's prn.  Report to Emergency Department for severe or worsening symptoms.  F/U with PCP.  RTC prn.

## 2018-03-22 LAB — BACTERIA SPEC AEROBE CULT: NORMAL

## 2018-03-23 LAB
BACTERIA SPEC ANAEROBE CULT: NORMAL

## 2018-04-17 ENCOUNTER — TELEPHONE (OUTPATIENT)
Dept: INTERNAL MEDICINE | Facility: CLINIC | Age: 53
End: 2018-04-17

## 2018-04-17 DIAGNOSIS — L40.9 PSORIASIS: Primary | ICD-10-CM

## 2018-04-17 NOTE — TELEPHONE ENCOUNTER
----- Message from Jami Herrera sent at 4/17/2018 12:39 PM CDT -----  Contact: Pt [phone]  Patient would like to get a referral.  Does the patient already have the specialty clinic appointment scheduled:    If yes, what date is the appointment scheduled:     Referral to what specialty:  opthamology  Reason (be specific):  For eye exam for psoriasis   Does the patient want the referral with a specific physician:    Is this an [facility]:  [facility] [place]  Comments:  Fax # [phone] she needs the original referral to say opthalmology not optometry

## 2018-04-17 NOTE — TELEPHONE ENCOUNTER
Patient requesting an referral to opthalmology, dx: eye exam, psoriasis, [facility], fax [phone]

## 2018-04-18 ENCOUNTER — PATIENT MESSAGE (OUTPATIENT)
Dept: INTERNAL MEDICINE | Facility: CLINIC | Age: 53
End: 2018-04-18

## 2018-05-01 ENCOUNTER — TELEPHONE (OUTPATIENT)
Dept: OPTOMETRY | Facility: CLINIC | Age: 53
End: 2018-05-01

## 2018-05-01 NOTE — TELEPHONE ENCOUNTER
EYEMED BENEFIT DETAILS as of 5/2/18:    Member Name: SHAN BUENO  Member ID: [id number]  Social Security Number: [id number]  YOB: 1965  Address: [address] [place] [address]  Phone Number: [phone]  Gender: Female  Responsible Member: SHAN BUENO    Network: [id number] [employer]  Group: [employer] ([id number])  Benefit Level: 1  Service Eligibility  If you have more than one location under your User ID, choose a location then use the drop-down menu to choose the provider who is rendering services.     From the tabs below, select the type of benefit you will be providing, then check the box(es) next to the applicable service(s). You will not receive an authorization for this member. Instead, simply click Submit Claim to start the claim process.    Routine refers to routine vision benefits, including eye exams, lenses, frames and contact lenses.   Medical refers to benefits for medical eye care services, including diabetic eye care.   Additional Purchases will calculate member payments on additional pairs of glasses and other materials members receive discounts on so you can determine member out-of-pocket costs.  To learn more, download our Member Benefits Display Job Aid.          Location [address] [facility], [address] ([id number])  Provider   Date of Service: 05/02/2018  Routine Medical Additional Purchase            Service     Member is Eligible?          Member Eligible As Of   Exam                       No                          01/01/2019   Lenses           No                          01/01/2019   Frames           No                          01/01/2020   Contact Lenses        No                          01/01/2019   Contact Lens Fit & Follow-up Yes              01/01/2018

## 2018-05-02 ENCOUNTER — NURSE TRIAGE (OUTPATIENT)
Dept: ADMINISTRATIVE | Facility: CLINIC | Age: 53
End: 2018-05-02

## 2018-05-02 ENCOUNTER — OFFICE VISIT (OUTPATIENT)
Dept: OPHTHALMOLOGY | Facility: CLINIC | Age: 53
End: 2018-05-02
Payer: COMMERCIAL

## 2018-05-02 ENCOUNTER — PATIENT MESSAGE (OUTPATIENT)
Dept: OPHTHALMOLOGY | Facility: CLINIC | Age: 53
End: 2018-05-02

## 2018-05-02 DIAGNOSIS — H40.003 GLAUCOMA SUSPECT OF BOTH EYES: Primary | ICD-10-CM

## 2018-05-02 DIAGNOSIS — I10 ESSENTIAL HYPERTENSION: ICD-10-CM

## 2018-05-02 DIAGNOSIS — H52.7 REFRACTIVE ERROR: ICD-10-CM

## 2018-05-02 DIAGNOSIS — Z98.890 HX OF LASIK: ICD-10-CM

## 2018-05-02 PROCEDURE — 92004 COMPRE OPH EXAM NEW PT 1/>: CPT | Mod: S$GLB,,, | Performed by: OPHTHALMOLOGY

## 2018-05-02 PROCEDURE — 76514 ECHO EXAM OF EYE THICKNESS: CPT | Mod: S$GLB,,, | Performed by: OPHTHALMOLOGY

## 2018-05-02 PROCEDURE — 99999 PR PBB SHADOW E&M-EST. PATIENT-LVL II: CPT | Mod: PBBFAC,,, | Performed by: OPHTHALMOLOGY

## 2018-05-02 RX ORDER — LATANOPROST 50 UG/ML
1 SOLUTION/ DROPS OPHTHALMIC NIGHTLY
Qty: 2.5 ML | Refills: 6 | Status: SHIPPED | OUTPATIENT
Start: 2018-05-02 | End: 2018-06-06 | Stop reason: SDUPTHER

## 2018-05-02 NOTE — TELEPHONE ENCOUNTER
Reason for Disposition   Health Information question, no triage required and triager able to answer question    Protocols used: ST INFORMATION ONLY CALL-A-NIKITA Das was told by optometrist that she needs to see an opthalmology due to low pressures in her eyes. She states when she called today the scheduling desk gave her an appt with optometrist again. Advised she needs to call opthalmology extension again around 0800 when they open and get an appt with opthalmologist as directed by the optometrist. She is wondering if opthalmology would fall under medical or vision. Advised she needs to call her insurance. Advised to start with calling number on back of medical card. She agrees to do that now. Please contact caller with any further care advice.

## 2018-05-02 NOTE — PROGRESS NOTES
Subjective:       Patient ID: Thiago Rincon is a 52 y.o. female.    Chief Complaint: Glaucoma Suspect    HPI     Pt states she was seen on 4/28/18 by  and was told her IOP was   50/40. Pt states she was not given any drops. Pt has a family H/X of   Glaucoma. Pt c/o of onset of blurry Va. Pt had Lasik Sx OU X 2007. Pt has   a floater in RT. Glare is a bother. Pt denies flashes and headaches.     Eyemeds  Tobradex Oint PRN      Last edited by Clotilde Navarro on 5/2/2018 10:16 AM. (History)             Assessment:       1. Glaucoma suspect of both eyes    2. Essential hypertension    3. Refractive error    4. Hx of LASIK        Plan:       Glaucoma suspect OU-Pt with elevated IOP's today & on 4/28/18 at Dr Jones's office. Pt with enlarged C/d ratio's OU but healthy appearing ON's OU. Positive fam h/o glaucoma (maternal grandfather).   CCT's: OD 55 (0),  (+1).  HTN-No retinopathy OU.  RE& s/p LASIK OU-Stable.        Start Xalatan OD.  Pt to call for return appt. In 2-4 wks for IOP's, HVF's & OCT's.  Control HTN.

## 2018-05-12 ENCOUNTER — NURSE TRIAGE (OUTPATIENT)
Dept: ADMINISTRATIVE | Facility: CLINIC | Age: 53
End: 2018-05-12

## 2018-05-12 NOTE — TELEPHONE ENCOUNTER
Have been taking medication for glaucoma latanoprost for a week. That eye has a small blood shot area. Has been present for several days. No problems with vision or anything. Looked up medication and was advised to contact MD if blood shot area is present while taking the medication.    Reason for Disposition   Caller has URGENT medication question about med that PCP prescribed and triager unable to answer question    Protocols used: ST MEDICATION QUESTION CALL-A-AH

## 2018-05-12 NOTE — TELEPHONE ENCOUNTER
Pt called back with message concerning had not spoken with on call provider.  contacted and states pt was connected. Pt called back no answer. Message left to call  Back if services is still needed.    Reason for Disposition   Message left on identified answering machine.    Protocols used: ST NO CONTACT OR DUPLICATE CONTACT CALL-A-AH

## 2018-05-14 ENCOUNTER — TELEPHONE (OUTPATIENT)
Dept: OPHTHALMOLOGY | Facility: CLINIC | Age: 53
End: 2018-05-14

## 2018-05-14 ENCOUNTER — PATIENT MESSAGE (OUTPATIENT)
Dept: OPHTHALMOLOGY | Facility: CLINIC | Age: 53
End: 2018-05-14

## 2018-05-18 ENCOUNTER — TELEPHONE (OUTPATIENT)
Dept: OPHTHALMOLOGY | Facility: CLINIC | Age: 53
End: 2018-05-18

## 2018-05-21 ENCOUNTER — PATIENT MESSAGE (OUTPATIENT)
Dept: OPHTHALMOLOGY | Facility: CLINIC | Age: 53
End: 2018-05-21

## 2018-05-24 ENCOUNTER — TELEPHONE (OUTPATIENT)
Dept: OPHTHALMOLOGY | Facility: CLINIC | Age: 53
End: 2018-05-24

## 2018-06-06 ENCOUNTER — CLINICAL SUPPORT (OUTPATIENT)
Dept: OPHTHALMOLOGY | Facility: CLINIC | Age: 53
End: 2018-06-06
Payer: COMMERCIAL

## 2018-06-06 ENCOUNTER — OFFICE VISIT (OUTPATIENT)
Dept: OPHTHALMOLOGY | Facility: CLINIC | Age: 53
End: 2018-06-06
Payer: COMMERCIAL

## 2018-06-06 DIAGNOSIS — H40.003 GLAUCOMA SUSPECT OF BOTH EYES: ICD-10-CM

## 2018-06-06 DIAGNOSIS — H40.003 GLAUCOMA SUSPECT OF BOTH EYES: Primary | ICD-10-CM

## 2018-06-06 DIAGNOSIS — Z98.890 HX OF LASIK: ICD-10-CM

## 2018-06-06 PROCEDURE — 99999 PR PBB SHADOW E&M-EST. PATIENT-LVL II: CPT | Mod: PBBFAC,,, | Performed by: OPHTHALMOLOGY

## 2018-06-06 PROCEDURE — 92133 CPTRZD OPH DX IMG PST SGM ON: CPT | Mod: S$GLB,,, | Performed by: OPHTHALMOLOGY

## 2018-06-06 PROCEDURE — 92012 INTRM OPH EXAM EST PATIENT: CPT | Mod: S$GLB,,, | Performed by: OPHTHALMOLOGY

## 2018-06-06 PROCEDURE — 92083 EXTENDED VISUAL FIELD XM: CPT | Mod: S$GLB,,, | Performed by: OPHTHALMOLOGY

## 2018-06-06 RX ORDER — LATANOPROST 50 UG/ML
1 SOLUTION/ DROPS OPHTHALMIC NIGHTLY
Qty: 7.7 ML | Refills: 2 | Status: SHIPPED | OUTPATIENT
Start: 2018-06-06 | End: 2022-01-18 | Stop reason: ALTCHOICE

## 2018-06-06 NOTE — PROGRESS NOTES
Subjective:       Patient ID: Thiago Rincon is a 52 y.o. female.    Chief Complaint: Glaucoma Suspect    HPI     DSL- 5/2/18     Pt is here for HVF and OCT review. Pt has been using Glaucoma as directed.    Eyemeds  Latanoprost QHS OD    Last edited by Clotilde Navarro on 6/6/2018  3:45 PM. (History)             Assessment:       1. Glaucoma suspect of both eyes    2. Hx of LASIK        Plan:       Glaucoma suspect OU-IOP's better OU today. HVF's are WNL's OU. OCT's are borderline OD & WNL's OS. Most likely represents OHT OU.  H/o LASIK OU-Stable.        Cont Xalatan OD & start OS.  RTC 6 mos for IOP's in AM.

## 2018-06-28 ENCOUNTER — PATIENT MESSAGE (OUTPATIENT)
Dept: OPHTHALMOLOGY | Facility: CLINIC | Age: 53
End: 2018-06-28

## 2018-08-08 ENCOUNTER — TELEPHONE (OUTPATIENT)
Dept: PODIATRY | Facility: CLINIC | Age: 53
End: 2018-08-08

## 2018-08-08 NOTE — TELEPHONE ENCOUNTER
Spoke to laury and she is looking for a Podiatry surgeon, scheduled her appt to see Dr. Herrmann in [place]

## 2018-08-08 NOTE — TELEPHONE ENCOUNTER
----- Message from Jeffery Osei sent at 8/8/2018  1:13 PM CDT -----  Contact: Pt  Pt would like to be called back regarding bunion on left foot is changing in size pt would like to be seen on only Tuesday's or Wednesday's if possible.    Pt can be reached at [phone].    Thank You.

## 2018-08-09 ENCOUNTER — PATIENT MESSAGE (OUTPATIENT)
Dept: OBSTETRICS AND GYNECOLOGY | Facility: CLINIC | Age: 53
End: 2018-08-09

## 2018-08-13 ENCOUNTER — OFFICE VISIT (OUTPATIENT)
Dept: PODIATRY | Facility: CLINIC | Age: 53
End: 2018-08-13
Payer: COMMERCIAL

## 2018-08-13 VITALS
WEIGHT: 222 LBS | SYSTOLIC BLOOD PRESSURE: 133 MMHG | RESPIRATION RATE: 18 BRPM | DIASTOLIC BLOOD PRESSURE: 88 MMHG | BODY MASS INDEX: 37.9 KG/M2 | HEART RATE: 82 BPM | HEIGHT: 64 IN

## 2018-08-13 DIAGNOSIS — M20.42 HAMMER TOE OF LEFT FOOT: ICD-10-CM

## 2018-08-13 DIAGNOSIS — M21.612 BUNION, LEFT FOOT: Primary | ICD-10-CM

## 2018-08-13 DIAGNOSIS — L60.0 INGROWN NAIL: ICD-10-CM

## 2018-08-13 PROCEDURE — 3079F DIAST BP 80-89 MM HG: CPT | Mod: CPTII,S$GLB,, | Performed by: PODIATRIST

## 2018-08-13 PROCEDURE — 99213 OFFICE O/P EST LOW 20 MIN: CPT | Mod: S$GLB,,, | Performed by: PODIATRIST

## 2018-08-13 PROCEDURE — 3075F SYST BP GE 130 - 139MM HG: CPT | Mod: CPTII,S$GLB,, | Performed by: PODIATRIST

## 2018-08-13 PROCEDURE — 3008F BODY MASS INDEX DOCD: CPT | Mod: CPTII,S$GLB,, | Performed by: PODIATRIST

## 2018-08-13 PROCEDURE — 99999 PR PBB SHADOW E&M-EST. PATIENT-LVL III: CPT | Mod: PBBFAC,,, | Performed by: PODIATRIST

## 2018-08-13 NOTE — PROGRESS NOTES
Subjective:      Patient ID: Thiago Rincon is a 52 y.o. female.    Chief Complaint: Bunions and Ingrown Toenail (left great toe)    Thiago is a 52 y.o. female who presents to the podiatry clinic  with complaint of  left foot pain. Onset of the symptoms was several years ago. Precipitating event: none known. Current symptoms include: ability to bear weight, but with some pain. Aggravating factors: long periods of weightbearing . Symptoms have progressed to a point and plateaued. Patient has had no prior foot problems. Evaluation to date: none. Treatment to date: avoidance of offending activity. Patients rates pain 3/10 on pain scale. Pt. 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 Has tried Spenco orthotics and wide width shoes. Describes pain as throbbing to left foot bunion deformity. Also complains of ingrown nail left medial great toe and blister under right second toe.       Review of Systems   Constitution: Negative for chills, diaphoresis, fever and weakness.   Cardiovascular: Negative for claudication, cyanosis, leg swelling and syncope.   Respiratory: Negative for cough and shortness of breath.    Skin: Negative for color change, nail changes and suspicious lesions.   Musculoskeletal: Positive for joint pain, joint swelling and myalgias. Negative for falls, muscle cramps and muscle weakness.   Gastrointestinal: Negative for diarrhea, nausea and vomiting.   Neurological: Negative for disturbances in coordination, numbness, paresthesias, sensory change and tremors.   Psychiatric/Behavioral: Negative for altered mental status.           Objective:      Physical Exam   Constitutional: She appears well-developed. She is cooperative.   Oriented to time, place, and person.   Cardiovascular:   DP and PT pulses are palpable bilaterally. 3 sec capillary refill time and toes and feet are warm to touch proximally .  There is  hair growth on the feet and toes b/l. There is no edema b/l. No spider  veins or varicosities present b/l.      Musculoskeletal:   Equinus noted b/l ankles with < 10 deg DF noted. MMT 5/5 in DF/PF/Inv/Ev resistance with no reproduction of pain in any direction. Passive range of motion of ankle and pedal joints is painless b/l.      Hammertoe deformity 2nd toe B/L  L>R,       Visible and palpable bunion with pain at dorsomedial 1st metatarsal head left .  Hallux abducted left partially reducible, tracks laterally without being track bound.  No ecchymosis, erythema, edema, or cardinal signs infection or signs of trauma same foot.    Pes planus foot type.    Feet:   Right Foot:   Skin Integrity: Negative for callus or dry skin.   Left Foot:   Skin Integrity: Negative for callus or dry skin.   Lymphadenopathy:   Negative lymphadenopathy bilateral popliteal fossa and tarsal tunnel.   Neurological: She is alert.   Light touch, proprioception, and sharp/dull sensation are all intact bilaterally. Protective threshold with the [place]-Wienstein monofilament is intact bilaterally.    Skin:   No open lesions, lacerations or wounds noted.Interdigital spaces clean, dry and intact b/l. No erythema noted to b/l foot.  Nails normal color and trophic qualities.    Bullae formation noted right plantar second toe. No purulent drainage noted.     Medial  hallux nail margin of left  foot with ingrown nail plate. No erythema noted, no purulent drainage noted.              Psychiatric: She has a normal mood and affect.             Assessment:       Encounter Diagnoses   Name Primary?    Bunion, left foot Yes    Hammer toe of left foot     Ingrown nail - Left Foot          Plan:       Thiago was seen today for bunions and ingrown toenail.    Diagnoses and all orders for this visit:    Bunion, left foot  -     X-Ray Foot Complete Left; Future  -     ORTHOTIC DEVICE (DME)    Hammer toe of left foot  -     X-Ray Foot Complete Left; Future  -     ORTHOTIC DEVICE (DME)    Ingrown nail - Left Foot      I counseled  the patient on her conditions, their implications and medical management.    Weightbearing left foot xray to assess underlying deformity and for underlying osseous pathology.     Conservative treatments for bunion and hammertoe deformity left foot discussed include padding, hammertoe sleeves, extra-depth shoes; Surgical treatments discussed, including bunionectomy and hammertoe correction and generic post-op course. Patient opting to pursue surgical option after months of conservative care. Xrays ordered.     Discussed the options of slant back vs permanent removal of offending corners of nail left great toe medial border. Patient declines at this time.     Rx CMO to be worn at all times while ambulating. Prescription provided with list of local retailers.     With patient's permission right second toe bullae formation deroofed, serous drainage noted. Instructed to apply triple antibiotic ointment daily and cover with bandaid.     F/u prn.     Nasreen Herrmann DPM

## 2018-08-14 ENCOUNTER — TELEPHONE (OUTPATIENT)
Dept: PODIATRY | Facility: CLINIC | Age: 53
End: 2018-08-14

## 2018-08-14 NOTE — PATIENT INSTRUCTIONS
Bunion splints found on amazon search for:     Bunion Corrector and Bunion Relief Sleeve with Gel Pad Cushion Bunion Protector - Protection and Treatment for Hallux Valgus Bunion Pain     Equinus occurs when the upward bending motion of the joint (dorsiflexion)  Is limited. Exercises to address this attached below:      Seated Hamstring Stretch-     The following flexibility exercise may be suggested by your therapist. Stop the exercise if it causes pain and discuss it with your physical therapist or doctor.     Loop a towel around the ball of your foot and pull your toes towards your body. Keeping your knee straight   Hold for 10 seconds.   Repeat 3 times on each foot daily   .        Bent-Knee Calf Stretch    This exercise is designed to stretch and strengthen your feet and ankles. Before beginning the exercise, read through all the instructions. While exercising, breathe normally and dont bounce. If you feel any pain, stop the exercise. If pain persists, inform your healthcare provider:  · Stand an arms length away from a wall. Place the palms of your hands on the wall. Step forward about 12 inches with your ______ foot.  · Keeping toes pointed forward and both heels on the floor, bend both knees and lean forward. Hold for ______ seconds. Relax.  · Repeat ______ times. Do ______ sets a day.  Date Last Reviewed: 8/16/2015  © 6658-7440 [employer]. [address] [place] [address]. All rights reserved. This information is not intended as a substitute for professional medical care. Always follow your healthcare professional's instructions.        Bunion    You have a bunion. This is a bony bump at the base of your big toe, along the inside edge of your foot. As the bump gets bigger, it can become red, swollen, and painful with shoe wear.  Bunions may occur if you wear shoes that are too tight and pinch your toes together. High heels may make this worse. In some cases a bunion is due to poor  alignment of the foot and ankle. This puts extra weight on the instep of each foot.  Once a bunion forms, it changes the way weight is spread all across your foot. This causes the bunion to get worse over time. The big toe will bend more and more toward the other toes.  A minor bunion can be treated by:  · Wearing properly fitting shoes  · Using bunion pads  · Wearing shoe inserts, called orthotics, to better align the foot and ankle  Physical therapy with ultrasound or whirlpool baths can ease pain, redness, and swelling. Severe cases may require surgery. If you dont treat what is causing the bunion, it may get larger and more painful.  Home care  · Limit high heels. These shoes force your foot forward, crowding the toes together.  · Switch to comfortable shoes with a wide toe area. Or have your existing shoes stretched by a shoe repair shop.  · Avoid shoes that are tight, narrow, or pointed.  · If you are flat-footed, using arch supports may help prevent further deformity. The best shoe inserts are the ones custom made by a foot specialist, called a podiatrist, or other healthcare provider.  · Put a bunion pad over the bunion to ease pressure of your shoe against the bunion. You can buy these pads at most pharmacies without a prescription  · To reduce pain and swelling, apply an ice pack over the injured area for 15 to 20 minutes. Do this every 1 to 2 hours the first day. Keep using ice 3 to 4 times a day until the pain and swelling goes away.  · To make an ice pack, put ice cubes in a sealed zip-lock plastic bag. Wrap the bag in a clean, thin towel or cloth. Never put ice or an ice pack directly on the skin.  · You may use over-the-counter pain medicine to control pain, unless another medicine was prescribed. Talk with your provider before using these medicines if you have chronic liver or kidney disease, or ever had a stomach ulcer or GI (gastrointestinal) bleeding.  Follow-up care  Follow up with a podiatrist  or foot doctor, or as advised.  If X-rays were taken, you will be notified of any new findings that may affect your care.  When to seek medical care  Contact your healthcare provider if any of the following occur:  · Increasing pain or redness around the base of the big toe  · Painful ingrown toenail, with redness and swelling or pus around the nail  Date Last Reviewed: 11/21/2015  © 0733-0208 [employer]. [address] [place] [address], [place] [address]. All rights reserved. This information is not intended as a substitute for professional medical care. Always follow your healthcare professional's instructions.

## 2018-08-14 NOTE — TELEPHONE ENCOUNTER
Returned patient call and discussed left foot xray results. Pt. given additional recommendations for stretching exercises and bunion splint recommendations.

## 2018-08-17 ENCOUNTER — TELEPHONE (OUTPATIENT)
Dept: PODIATRY | Facility: CLINIC | Age: 53
End: 2018-08-17

## 2018-08-17 NOTE — TELEPHONE ENCOUNTER
----- Message from Nasreen Herrmann DPM sent at 8/17/2018  7:53 AM CDT -----  Contact: [phone]/selfq  Luke Acosta,    I added some info to the pt's AVS after it was printed. I thought it would update automatically on the portal but I think once its printed out that is it. I have the updated AVS printed out, could I maybe fax it to you, or what do you think is the best way we could get this info to the patient electronically?     ----- Message -----  From: Karla French MA  Sent: 8/16/2018   4:20 PM  To: Nasreen Herrmann DPM      ----- Message -----  From: Letha Barraza LPN  Sent: 8/16/2018  11:11 AM  To: Karla French MA        ----- Message -----  From: Polly Lazar  Sent: 8/16/2018  10:06 AM  To: [facility] Staff    Pt called stating Dr Herrmann was supposed to upload information in the [facility] portal and the information didn't go through . Please advise

## 2018-09-02 ENCOUNTER — PATIENT MESSAGE (OUTPATIENT)
Dept: PODIATRY | Facility: CLINIC | Age: 53
End: 2018-09-02

## 2018-09-07 ENCOUNTER — OFFICE VISIT (OUTPATIENT)
Dept: ALLERGY | Facility: CLINIC | Age: 53
End: 2018-09-07
Payer: COMMERCIAL

## 2018-09-07 VITALS
BODY MASS INDEX: 38.32 KG/M2 | WEIGHT: 224.44 LBS | OXYGEN SATURATION: 97 % | HEART RATE: 103 BPM | SYSTOLIC BLOOD PRESSURE: 144 MMHG | HEIGHT: 64 IN | DIASTOLIC BLOOD PRESSURE: 90 MMHG

## 2018-09-07 DIAGNOSIS — J31.0 RHINITIS, UNSPECIFIED TYPE: ICD-10-CM

## 2018-09-07 DIAGNOSIS — H10.9 CONJUNCTIVITIS OF BOTH EYES, UNSPECIFIED CONJUNCTIVITIS TYPE: ICD-10-CM

## 2018-09-07 DIAGNOSIS — L40.9 PSORIASIS: ICD-10-CM

## 2018-09-07 DIAGNOSIS — K21.9 LARYNGOPHARYNGEAL REFLUX: ICD-10-CM

## 2018-09-07 DIAGNOSIS — R06.2 WHEEZING: ICD-10-CM

## 2018-09-07 DIAGNOSIS — R06.00 DYSPNEA, UNSPECIFIED TYPE: Primary | ICD-10-CM

## 2018-09-07 DIAGNOSIS — K21.9 GASTROESOPHAGEAL REFLUX DISEASE WITHOUT ESOPHAGITIS: ICD-10-CM

## 2018-09-07 DIAGNOSIS — I10 ESSENTIAL HYPERTENSION: ICD-10-CM

## 2018-09-07 DIAGNOSIS — F31.9 BIPOLAR DEPRESSION: ICD-10-CM

## 2018-09-07 DIAGNOSIS — E78.2 MIXED HYPERLIPIDEMIA: ICD-10-CM

## 2018-09-07 PROCEDURE — 3077F SYST BP >= 140 MM HG: CPT | Mod: CPTII,S$GLB,, | Performed by: ALLERGY & IMMUNOLOGY

## 2018-09-07 PROCEDURE — 99205 OFFICE O/P NEW HI 60 MIN: CPT | Mod: S$GLB,,, | Performed by: ALLERGY & IMMUNOLOGY

## 2018-09-07 PROCEDURE — 3008F BODY MASS INDEX DOCD: CPT | Mod: CPTII,S$GLB,, | Performed by: ALLERGY & IMMUNOLOGY

## 2018-09-07 PROCEDURE — 3080F DIAST BP >= 90 MM HG: CPT | Mod: CPTII,S$GLB,, | Performed by: ALLERGY & IMMUNOLOGY

## 2018-09-07 PROCEDURE — 99999 PR PBB SHADOW E&M-EST. PATIENT-LVL III: CPT | Mod: PBBFAC,,, | Performed by: ALLERGY & IMMUNOLOGY

## 2018-09-07 RX ORDER — AMLODIPINE BESYLATE 5 MG/1
5 TABLET ORAL DAILY
COMMUNITY
End: 2022-01-18 | Stop reason: ALTCHOICE

## 2018-09-07 NOTE — PROGRESS NOTES
"Thiago Rincon is a  52-year-old female who presents to clinic today for evaluation of chronic rhinitis, cough, and shortness of breath.  She is a respiratory therapist at [employer].    Her uncle, Dr. Justin Fregoso, a physician in her community has been serving as her primary care physician.    About one and a half weeks ago she had an increase in her rhinitis with headaches, pruritus of the nose, eyes, ears, and throat, swelling of the eyes and face, a sensation of throat closing, ear pressure, frequent throat clearing, sore throats, hoarseness,  a dry throat, a cough that has been productive of a small amount of mucus, wheezing, and shortness of breath.  Her symptoms are worse when she lies down at night.  She has not been able lie flat.    She has been moving over this time and has been under a lot of stress.      She has been to the emergency room 3 times, twice at [facility] and once at [facility].    She has received  IV steroids and given a prescription for Symbicort.  This caused increased palpitations and she has not been taking regularly.  She also has albuterol.      She did get a CT of the neck by Dr. Chamberlain in the ED that was normal.    15 to 20 years ago she was seen in [place] by an allergist who did testing.  She thinks that she was allergic to dust mites, trees, grass, and weeds.      She usually gets 2 to 3 "sinus infections"a year usually during the spring and fall.  She also has symptoms with weather changes.    She has been taking Mucinex daily which does help.  She has been using OTC eyedrops.    She was taking oral cephalexin for an ear infection and discontinued this three days ago.  She takes this antibiotic frequently for her psoriasis and ear infections.    She does have a history of GERD and takes Pepcid when needed.  Last night she took Tums.  She has been having increased indigestion.  She has had food stuck in her throat.    When she was about 19 years old she had " "a catepillar sting on the right leg.  It became swollen.  She took Benadryl and thinks this may have led to the swelling.  She also had Benadryl in her 20's at [facility].  Her "eyes rolled back in the head".    This was attributed to the Benadryl.    For ROS, FH, SH please see Allergy and Asthma Questionnaire dated today.    Some relevant pertinent positives:    Review of Systems:   She has psoriasis and has been followed by a dermatologist in the past.  She does not currently have one.  She has pre glaucoma and is on lantanoprost. She has bipolar depression and takes lithium and Seroquel.  She has hypertension and takes amlodipine.    This was recently changed from lisinopril.  She has hyperlipidemia and takes simvastatin.  She has been taking a diuretic as needed for dependent edema.  She has had muscle spasms on both sides of the neck.    Family History:   Her mother and father have allergies.    Home environment:   She has lived in the same house for the past year and nine months and is currently moving.  There was no water damage.  There is no evidence of mold.  There is no carpeting in the bedroom.  There is one cat and one dog that lives inside the house.  She does not have problems around pets.    Social History:   She smokes 1/2 pack of cigarettes a day and has for the last three years.  Before then she was a social smoker.  She has not been able to smoke as much with  The increased respiratory symptoms.    Physical Examination:  General: Well-developed, well-nourished, no acute distress.  Clearing throat throughout interview.  Head: No sinus tenderness.  Eyes: Conjunctivae:  No bulbar or palpebral conjunctival injection.  Ears: EAC's clear.  TM's clear.  No pre-auricular nodes.  Nose: Nasal Mucosa:  Pink.  Septum: No apparent deviation.  Turbinates:  No significant edema.  Polyps/Mass:  None visible.  Teeth/Gums:  No bleeding noted.  Oropharynx: No exudates.  Neck: Supple without thyromegaly. No " "cervical lymphadenopathy.    Respiratory/Chest: Effort: Good.  Auscultation:  Clear bilaterally.  Cardiovascular:  No murmur, rubs, or gallop heard.   GI:  Non-tender.  No masses.  No organomegaly.  Extremities:  No swelling.  No cyanosis, clubbing, or edema.  Skin: Good turgor.  No urticaria or angioedema.    Psoriatic lesions both lower extremities.  Neuro/Psych: Oriented x 3.    Assessment:  1.  Chronic rhinitis, consider allergic.  2.  Chronic conjunctivitis, consider allergic.  3.  Recurrent cough and wheezing, consider bronchospasm.  4.  GERD.  5.  Probable LPR.    6.  Hypertension on amlodipine.    7.  Pre glaucoma on latanoprost.    8.  Bipolar depression on lithium and Seroquel.    9.  Psoriasis.  10.  Hyperlipidemia on simvastatin.  11.  Consider viral upper respiratory infection.  12.  History of frequent "sinus infections".    Recommendations:  1.  Laboratory as ordered.  2.  Spirometry.    3.  ENT evaluation for LPR.  4.  Continue present medications for now.    5.  Obtain records from all three emergency department visits.  "

## 2018-09-08 ENCOUNTER — NURSE TRIAGE (OUTPATIENT)
Dept: ADMINISTRATIVE | Facility: CLINIC | Age: 53
End: 2018-09-08

## 2018-09-08 ENCOUNTER — PATIENT MESSAGE (OUTPATIENT)
Dept: ALLERGY | Facility: CLINIC | Age: 53
End: 2018-09-08

## 2018-09-08 NOTE — TELEPHONE ENCOUNTER
"  Reason for Disposition   [1] MILD swelling of both ankles (i.e., pedal edema) AND [2] new onset or worsening    Answer Assessment - Initial Assessment Questions  1. ONSET: "When did the swelling start?" (e.g., minutes, hours, days)      A few days ago  2. LOCATION: "What part of the leg is swollen?"  "Are both legs swollen or just one leg?"      Both legs from calves to ankles  3. SEVERITY: "How bad is the swelling?" (e.g., localized; mild, moderate, severe)   - Localized - small area of swelling localized to one leg   - MILD pedal edema - swelling limited to foot and ankle, pitting edema < 1/4 inch (6 mm) deep, rest and elevation eliminate most or all swelling   - MODERATE edema - swelling of lower leg to knee, pitting edema > 1/4 inch (6 mm) deep, rest and elevation only partially reduce swelling   - SEVERE edema - swelling extends above knee, facial or hand swelling present       Mild  4. REDNESS: "Does the swelling look red or infected?"      No  5. PAIN: "Is the swelling painful to touch?" If so, ask: "How painful is it?"   (Scale 1-10; mild, moderate or severe)      No  6. FEVER: "Do you have a fever?" If so, ask: "What is it, how was it measured, and when did it start?"       Fever  7. CAUSE: "What do you think is causing the leg swelling?"      Fluid  8. MEDICAL HISTORY: "Do you have a history of heart failure, kidney disease, liver failure, or cancer?"      High blood pressure  9. RECURRENT SYMPTOM: "Have you had leg swelling before?" If so, ask: "When was the last time?" "What happened that time?"      No  10. OTHER SYMPTOMS: "Do you have any other symptoms?" (e.g., chest pain, difficulty breathing)        No  11. PREGNANCY: "Is there any chance you are pregnant?" "When was your last menstrual period?"        No    Protocols used: ST LEG SWELLING AND EDEMA-A-AH    "

## 2018-09-10 ENCOUNTER — PATIENT MESSAGE (OUTPATIENT)
Dept: ALLERGY | Facility: CLINIC | Age: 53
End: 2018-09-10

## 2018-09-10 NOTE — TELEPHONE ENCOUNTER
Spoke with patient re: follow up nurse triage re: ankle pain    Patient states that she is feeling much better

## 2018-09-14 ENCOUNTER — PATIENT MESSAGE (OUTPATIENT)
Dept: OPHTHALMOLOGY | Facility: CLINIC | Age: 53
End: 2018-09-14

## 2018-10-16 ENCOUNTER — HOSPITAL ENCOUNTER (EMERGENCY)
Facility: HOSPITAL | Age: 53
Discharge: PSYCHIATRIC OR SUBSTANCE USE FACILITY | End: 2018-10-16
Attending: EMERGENCY MEDICINE

## 2018-10-16 VITALS
OXYGEN SATURATION: 98 % | RESPIRATION RATE: 18 BRPM | DIASTOLIC BLOOD PRESSURE: 62 MMHG | WEIGHT: 225 LBS | TEMPERATURE: 98 F | SYSTOLIC BLOOD PRESSURE: 116 MMHG | BODY MASS INDEX: 36.16 KG/M2 | HEART RATE: 79 BPM | HEIGHT: 66 IN

## 2018-10-16 DIAGNOSIS — R46.2 BIZARRE BEHAVIOR: Primary | ICD-10-CM

## 2018-10-16 DIAGNOSIS — R46.89 COMBATIVE BEHAVIOR: ICD-10-CM

## 2018-10-16 DIAGNOSIS — Z00.8 MEDICAL CLEARANCE FOR PSYCHIATRIC ADMISSION: ICD-10-CM

## 2018-10-16 PROBLEM — F29 PSYCHOSIS: Status: ACTIVE | Noted: 2018-10-16

## 2018-10-16 LAB
ALBUMIN SERPL BCP-MCNC: 3.8 G/DL
ALP SERPL-CCNC: 68 U/L
ALT SERPL W/O P-5'-P-CCNC: 11 U/L
AMPHET+METHAMPHET UR QL: NEGATIVE
ANION GAP SERPL CALC-SCNC: 13 MMOL/L
APAP SERPL-MCNC: <3 UG/ML
AST SERPL-CCNC: 17 U/L
B-HCG UR QL: NEGATIVE
BACTERIA #/AREA URNS AUTO: ABNORMAL /HPF
BARBITURATES UR QL SCN>200 NG/ML: NEGATIVE
BASOPHILS # BLD AUTO: 0.05 K/UL
BASOPHILS NFR BLD: 0.6 %
BENZODIAZ UR QL SCN>200 NG/ML: NEGATIVE
BILIRUB SERPL-MCNC: 0.4 MG/DL
BILIRUB UR QL STRIP: NEGATIVE
BUN SERPL-MCNC: 9 MG/DL
BZE UR QL SCN: NEGATIVE
CALCIUM SERPL-MCNC: 9.6 MG/DL
CANNABINOIDS UR QL SCN: NEGATIVE
CHLORIDE SERPL-SCNC: 108 MMOL/L
CLARITY UR REFRACT.AUTO: ABNORMAL
CO2 SERPL-SCNC: 19 MMOL/L
COLOR UR AUTO: YELLOW
CREAT SERPL-MCNC: 0.7 MG/DL
CREAT UR-MCNC: 344 MG/DL
CTP QC/QA: YES
DIFFERENTIAL METHOD: NORMAL
EOSINOPHIL # BLD AUTO: 0.1 K/UL
EOSINOPHIL NFR BLD: 0.8 %
ERYTHROCYTE [DISTWIDTH] IN BLOOD BY AUTOMATED COUNT: 13 %
EST. GFR  (AFRICAN AMERICAN): >60 ML/MIN/1.73 M^2
EST. GFR  (NON AFRICAN AMERICAN): >60 ML/MIN/1.73 M^2
ETHANOL SERPL-MCNC: <10 MG/DL
GLUCOSE SERPL-MCNC: 116 MG/DL
GLUCOSE UR QL STRIP: NEGATIVE
HCT VFR BLD AUTO: 40.2 %
HGB BLD-MCNC: 13.2 G/DL
HGB UR QL STRIP: NEGATIVE
HYALINE CASTS UR QL AUTO: 8 /LPF
IMM GRANULOCYTES # BLD AUTO: 0.02 K/UL
IMM GRANULOCYTES NFR BLD AUTO: 0.2 %
KETONES UR QL STRIP: ABNORMAL
LEUKOCYTE ESTERASE UR QL STRIP: NEGATIVE
LITHIUM SERPL-SCNC: 0.2 MMOL/L
LYMPHOCYTES # BLD AUTO: 1.8 K/UL
LYMPHOCYTES NFR BLD: 21.2 %
MCH RBC QN AUTO: 29.5 PG
MCHC RBC AUTO-ENTMCNC: 32.8 G/DL
MCV RBC AUTO: 90 FL
METHADONE UR QL SCN>300 NG/ML: NEGATIVE
MICROSCOPIC COMMENT: ABNORMAL
MONOCYTES # BLD AUTO: 0.6 K/UL
MONOCYTES NFR BLD: 7.1 %
NEUTROPHILS # BLD AUTO: 6.1 K/UL
NEUTROPHILS NFR BLD: 70.1 %
NITRITE UR QL STRIP: NEGATIVE
NRBC BLD-RTO: 0 /100 WBC
OPIATES UR QL SCN: NEGATIVE
PCP UR QL SCN>25 NG/ML: NEGATIVE
PH UR STRIP: 5 [PH] (ref 5–8)
PLATELET # BLD AUTO: 273 K/UL
PMV BLD AUTO: 11.7 FL
POTASSIUM SERPL-SCNC: 3.6 MMOL/L
PROT SERPL-MCNC: 7.1 G/DL
PROT UR QL STRIP: ABNORMAL
RBC # BLD AUTO: 4.48 M/UL
RBC #/AREA URNS AUTO: 1 /HPF (ref 0–4)
SALICYLATES SERPL-MCNC: <5 MG/DL
SODIUM SERPL-SCNC: 140 MMOL/L
SP GR UR STRIP: 1.03 (ref 1–1.03)
SQUAMOUS #/AREA URNS AUTO: 1 /HPF
TOXICOLOGY INFORMATION: ABNORMAL
TSH SERPL DL<=0.005 MIU/L-ACNC: 1.05 UIU/ML
URN SPEC COLLECT METH UR: ABNORMAL
UROBILINOGEN UR STRIP-ACNC: 2 EU/DL
WBC # BLD AUTO: 8.68 K/UL
WBC #/AREA URNS AUTO: 1 /HPF (ref 0–5)

## 2018-10-16 PROCEDURE — 80178 ASSAY OF LITHIUM: CPT

## 2018-10-16 PROCEDURE — 80307 DRUG TEST PRSMV CHEM ANLYZR: CPT

## 2018-10-16 PROCEDURE — 99285 EMERGENCY DEPT VISIT HI MDM: CPT | Mod: ,,, | Performed by: EMERGENCY MEDICINE

## 2018-10-16 PROCEDURE — 81001 URINALYSIS AUTO W/SCOPE: CPT

## 2018-10-16 PROCEDURE — 80320 DRUG SCREEN QUANTALCOHOLS: CPT

## 2018-10-16 PROCEDURE — 99285 EMERGENCY DEPT VISIT HI MDM: CPT | Mod: 25

## 2018-10-16 PROCEDURE — 93005 ELECTROCARDIOGRAM TRACING: CPT

## 2018-10-16 PROCEDURE — 96372 THER/PROPH/DIAG INJ SC/IM: CPT

## 2018-10-16 PROCEDURE — 93010 ELECTROCARDIOGRAM REPORT: CPT | Mod: ,,, | Performed by: INTERNAL MEDICINE

## 2018-10-16 PROCEDURE — 80053 COMPREHEN METABOLIC PANEL: CPT

## 2018-10-16 PROCEDURE — 84443 ASSAY THYROID STIM HORMONE: CPT

## 2018-10-16 PROCEDURE — 63600175 PHARM REV CODE 636 W HCPCS: Performed by: EMERGENCY MEDICINE

## 2018-10-16 PROCEDURE — 80329 ANALGESICS NON-OPIOID 1 OR 2: CPT

## 2018-10-16 PROCEDURE — 81025 URINE PREGNANCY TEST: CPT | Performed by: EMERGENCY MEDICINE

## 2018-10-16 PROCEDURE — 85025 COMPLETE CBC W/AUTO DIFF WBC: CPT

## 2018-10-16 RX ORDER — HALOPERIDOL 5 MG/ML
5 INJECTION INTRAMUSCULAR
Status: COMPLETED | OUTPATIENT
Start: 2018-10-16 | End: 2018-10-16

## 2018-10-16 RX ORDER — LISINOPRIL 10 MG/1
10 TABLET ORAL DAILY
COMMUNITY
End: 2022-01-18 | Stop reason: ALTCHOICE

## 2018-10-16 RX ORDER — LORAZEPAM 2 MG/ML
2 INJECTION INTRAMUSCULAR
Status: DISCONTINUED | OUTPATIENT
Start: 2018-10-16 | End: 2018-10-16 | Stop reason: HOSPADM

## 2018-10-16 RX ADMIN — HALOPERIDOL LACTATE 5 MG: 5 INJECTION, SOLUTION INTRAMUSCULAR at 11:10

## 2018-10-16 NOTE — ED NOTES
Patient resting in stretcher and is in NAD at this time. Pt is awake and alert, respirations even and unlabored. Pt denies pain at this time. Pt remains calm at this time. Environment safe. Werner Quiles, at bedside for one to one observation and q15min safety checks. Pt aware of POC. Bed low and locked with side rails up x2.

## 2018-10-16 NOTE — ED NOTES
Attempted to call report to [facility]. All staff in a meeting at this time and unable to take report, per Nicolasa.

## 2018-10-16 NOTE — ED NOTES
Pt calm and cooperative at this time, intermittently paces in room but does not appear agitated, answers staff questions and easily redirected at this time. Security and risk sitter remain at bedside.

## 2018-10-16 NOTE — ED NOTES
Pt ambulated via wheelchair from ED to [facility], escorted by ED staff and . All belongings brought with pt to [facility] and placed in secured belongings closet. Original  PEC document placed in pt's chart.  [facility] notified of pt's transfer to [facility]. Pt is calm and cooperative.Pt declined to have this RN notify next of kin of transfer to [facility]; pt was given contact info for [facility] and portable phone and elected to notify family herself. Pt was educated re: PEC status. Pt is dressed in blue paper scrubs. Resting comfortably in BH2. Respirations even and unlabored. NAD observed. Will cont to monitor.

## 2018-10-16 NOTE — ED NOTES
Pt again pacing in room, requesting food, juice and crackers provide, pt independently back in stretcher at this time.

## 2018-10-16 NOTE — ED TRIAGE NOTES
"Thiago Rincon, a 52 y.o. female presents to the ED via EMS with CC altered mental status. EMS reports pt found outside of car near heavy traffic road, pt states "I was having car trouble. I went to get food. I was going to call authorities but I got tired." Soiled with urine on arrival, denies being soiled. Pt answers questions delayed, uncooperative, paranoid toward staff and surroundings, agitated. Refusing exam, refusing to change clothes, pacing in room, refusing to sit in stretcher. PA at bedside and aware of pt behavior, states will order IM haldol.  "

## 2018-10-16 NOTE — ED NOTES
Pt escorted to [facility] by ed staff, risk sitter, and security x2 via wheelchair - calm and cooperative - pt sent with belongings including shirt pants stud earrings x2 and coin change - also sent with PEC and transfer consent form.

## 2018-10-16 NOTE — ED NOTES
Pt slightly more cooperative, security and risk sitter at bedside, pt in resting in stretcher and allows blood draw.

## 2018-10-16 NOTE — ED NOTES
Attempted to call report to [facility] again. Staff remains in a meeting, unable to accept report at this time, per Nicolasa.

## 2018-10-16 NOTE — ED NOTES
Pt tearful, again pacing in room, security and risk sitter remains at bedside, pt assisted back into stretcher, offered assistance, denies any need at this time.

## 2018-10-16 NOTE — ED PROVIDER NOTES
"Encounter Date: 10/16/2018       History     Chief Complaint   Patient presents with    Altered Mental Status     found with car parked in highway, walking down road, urinating on herself.  hx of bipolar     53 yo female with PMhx of Bipolar (previously on seroquel and lithium) presents for bizarre behavior. EMS was called by [place] police after patient stopped car in the middle of the highway, got out of it, and started walking down the road. Had noted to likely voided herself in car (per EMS seat was soaked and smelled of urine). Patient difficult to be redirected and required handcuffs. Attempting to walk off scene per police and EMS. Patient confused and not answering questions. GCS 14 per EMS. She denies HI/SI/AVH to myself and EMS. . No drug paraphernalia noted on scene. Patient states she "ran out of gas" and was walking down the road to "get some food." When I asked what she was going to do about the car in the middle of the highway she reports "I was going to notify authorities." Patient states she does have a cell phone and when asked why she did not call authorities she reports "I was tired." Patient then refusing to answer questions. Paranoid in room, refusing to sit in bed, not allowing staff to get close to her.     Per chart review patient with similar presentation in 2016, requiring admission to [facility]. At that time patient attempted overdose, was combative and refusing to answer questions and required restraints and multiple rounds of medications. Patient is not able to tell me if she is currently taking any medications.     Per chart review patient works as a respiratory therapist at [employer] and PCP is her uncle, Dr. Fregoso. She is a social smoker of tobacco but previously denies heavy ETOH or illicit's. Unable to obtain social history from patient currently.           Review of patient's allergies indicates:   Allergen Reactions    Lamictal [lamotrigine] Itching    Benadryl " "[diphenhydramine hcl] Rash     Past Medical History:   Diagnosis Date    Abnormal Pap smear of cervix     Alcohol abuse     Allergy     Anxiety     Bipolar disorder     Borderline personality disorder     Depression     History of psychiatric hospitalization     " a bizillion times"    History of suicidal tendencies     Hx of psychiatric care     Hypertension     Mood disorder     Psoriasis     Psychiatric problem     PTSD (post-traumatic stress disorder)     Schizoaffective disorder     Sleep difficulties     Suicide attempt     "tried to take pills, and slit my wrist. I couldnt tell ya when."    Therapy     Withdrawal symptoms, alcohol     Withdrawal symptoms, drug or narcotic      Past Surgical History:   Procedure Laterality Date    CERVICAL CONIZATION   W/ LASER  2007    COLONOSCOPY N/A 12/28/2017    Procedure: COLONOSCOPY;  Surgeon: Izaiah Bond MD;  [facility];  Service: Endoscopy;  Laterality: N/A;    COLONOSCOPY N/A 12/28/2017    [facility] at [facility]O    POLYPECTOMY N/A 12/28/2017    Performed by [facility] Bond MD at [facility] ENDO    vocal cord nodules       Family History   Problem Relation Age of Onset    Hypertension Father     Allergies Father     Bipolar disorder Father     Depression Father     Colon cancer Mother     Hypertension Mother     Allergies Mother     Alcohol abuse Mother     Anxiety disorder Mother     Depression Mother     Cancer Mother     Depression Brother     Drug abuse Brother     Colon cancer Maternal Aunt     Alcohol abuse Maternal Aunt     Depression Maternal Aunt     Bipolar disorder Cousin     Depression Cousin     Breast cancer Neg Hx     Ovarian cancer Neg Hx      Social History     Tobacco Use    Smoking status: Current Every Day Smoker     Packs/day: 0.25     Years: 5.00     Pack years: 1.25     Types: Cigarettes    Smokeless tobacco: Never Used   Substance Use Topics    Alcohol use: Yes     " Comment: I drink every six months socially    Drug use: No     Review of Systems   Unable to perform ROS: Psychiatric disorder       Physical Exam     Initial Vitals [10/16/18 1114]   BP Pulse Resp Temp SpO2   (!) 158/84 100 18 97.8 °F (36.6 °C) 98 %      MAP       --         Physical Exam    Nursing note and vitals reviewed.  Constitutional: She appears well-developed and well-nourished. She is not diaphoretic. She appears distressed.   Patient paranoid, frequently looking at camera, refusing to answer questions,    HENT:   Head: Normocephalic and atraumatic.   Mouth/Throat: Oropharynx is clear and moist. No oropharyngeal exudate.   mmm   Eyes: Conjunctivae and EOM are normal. Pupils are equal, round, and reactive to light. Right eye exhibits no discharge. Left eye exhibits no discharge.   3 mm and reactive    Neck: Normal range of motion. Neck supple. No JVD present.   Cardiovascular: Regular rhythm, normal heart sounds and intact distal pulses. Exam reveals no gallop and no friction rub.    No murmur heard.  Mild tachycardia    Pulmonary/Chest: Breath sounds normal. No respiratory distress. She has no wheezes. She has no rhonchi. She has no rales.   Abdominal: Soft. Bowel sounds are normal. She exhibits no distension. There is no tenderness. There is no rebound and no guarding.   Musculoskeletal: Normal range of motion. She exhibits no edema or tenderness.   Lymphadenopathy:     She has no cervical adenopathy.   Neurological: She is alert and oriented to person, place, and time. She has normal strength. No cranial nerve deficit or sensory deficit.   Unable to answer orientation questions. Steady gait, GCS 14 for confusion    Skin: Skin is warm and dry. Capillary refill takes less than 2 seconds.   Psychiatric: She has a normal mood and affect. Thought content normal.   +paranoid delusions, refusing to answer questions, appears to have thought blocking with long pauses between questions and answers. Pacing room  and attempting to leave, not responding to redirection. Denies HI/SI/AVH. Flat affect, staring off into space then intermittently bursting into tears. Avoiding eye contact         ED Course   Procedures  Labs Reviewed   COMPREHENSIVE METABOLIC PANEL - Abnormal; Notable for the following components:       Result Value    CO2 19 (*)     Glucose 116 (*)     All other components within normal limits   URINALYSIS, REFLEX TO URINE CULTURE - Abnormal; Notable for the following components:    Appearance, UA Hazy (*)     Protein, UA 1+ (*)     Ketones, UA 3+ (*)     All other components within normal limits    Narrative:     Preferred Collection Type->Urine, Clean Catch  CUP   DRUG SCREEN PANEL, URINE EMERGENCY - Abnormal; Notable for the following components:    Creatinine, Random Ur 344.0 (*)     All other components within normal limits    Narrative:     Preferred Collection Type->Urine, Clean Catch  CUP   ACETAMINOPHEN LEVEL - Abnormal; Notable for the following components:    Acetaminophen (Tylenol), Serum <3.0 (*)     All other components within normal limits   LITHIUM LEVEL - Abnormal; Notable for the following components:    Lithium Lvl 0.2 (*)     All other components within normal limits   SALICYLATE LEVEL - Abnormal; Notable for the following components:    Salicylate Lvl <5.0 (*)     All other components within normal limits   URINALYSIS MICROSCOPIC - Abnormal; Notable for the following components:    Bacteria, UA Few (*)     Hyaline Casts, UA 8 (*)     All other components within normal limits    Narrative:     Preferred Collection Type->Urine, Clean Catch  CUP   CBC W/ AUTO DIFFERENTIAL   TSH   ALCOHOL,MEDICAL (ETHANOL)   POCT URINE PREGNANCY     EKG Readings: (Independently Interpreted)   Initial Reading: No STEMI. Rhythm: Sinus Tachycardia. Heart Rate: 101. Ectopy: No Ectopy. Conduction: Normal. Axis: Normal. Clinical Impression: Sinus Tachycardia   QTc 461       Imaging Results    None          Medical  Decision Making:   History:   I obtained history from: EMS provider.  Old Medical Records: I decided to obtain old medical records.  Clinical Tests:   Lab Tests: Ordered    Additional MDM:   Psych: A Physician Emergency Certificate (PEC) was done in the ED for: Gravely Disabled. The patient has been medically cleared. Outcome: The patient was admitted by Psychiatry.     [facility] / Resident Notes:   51 yo female with PMhx of bipolar presents with bizarre behavior. Parked car in middle of highway and walking down street, urinated on self. On exam paranoid, thought blocking, avoiding eye contact, attempting to leave requiring hospital police. Ddx includes but is not limited to mood disorder, substance induced mood disorder, overdose, occult infection, UTI, metabolic derangement, less likely organic brain dysfunction. Mostly likely acute exacerbation of psychiatric disorder given patient presentation and consistency with previous admissions. Haldol 5 mg IM given to facilitate physical exam with some improvement. Patient still pacing and standing at door, asking when she can leave. PEC filled out and will obtain psychiatric clearance labs. If medically cleared will seek placement.   Neda Byrnes MD HO-4  12:16 PM 10/16/2018    5:00 PM  Patient medically cleared. Discussed with psychiatry and they are seeing if they have beds available. Otherwise will seek placement elsewhere. Patient more calm and cooperative now, but still with difficulty understanding and bizarre behavior. Will search for other psychiatric bed.   Neda Byrnes MD HO-4  5:03 PM 10/16/2018         Attending Attestation:   Physician Attestation Statement for Resident:  As the supervising MD   Physician Attestation Statement: I have personally seen and examined this patient.   I agree with the above history. -:   As the supervising MD I agree with the above PE.    As the supervising MD I agree with the above treatment, course, plan, and disposition.   -:  Emergent evaluation of a 52-year-old female with history of bipolar disorder presenting by EMS for evaluation of altered mental status.  Patient states that she has been feeling depressed and anxious over the past several days.  She reports noncompliance with medications.  She reports wanting to be seen by her psychiatrist today for refill of her medications.  Otherwise patient's vital signs are stable on arrival.  Patient was initially paranoid but is now calm and cooperative.  Will medically clear and consult Psychiatry.    Pt admitted to [facility]  I have reviewed and agree with the residents interpretation of the following: lab data and EKG.          Physician Attestation for Scribe:      Comments: I, Dr. Leann Quinteros, personally performed the services described in this documentation. All medical record entries made by the scribe were at my direction and in my presence.  I have reviewed the chart and agree that the record reflects my personal performance and is accurate and complete. Leann Quinteros MD.                 Clinical Impression:   The primary encounter diagnosis was Bizarre behavior. Diagnoses of Medical clearance for psychiatric admission and Combative behavior were also pertinent to this visit.      Disposition:   Disposition: Admitted  Condition: Fair                        Neda Byrnes MD  Resident  10/16/18 7303       Leann Quinteros MD  10/19/18 0752

## 2018-10-16 NOTE — ED NOTES
Patient accepted by Nicolasa at [facility] ([address] [place] [address]) for the service of Dr. Cooper.   Report to be called to [phone].

## 2018-10-16 NOTE — ED NOTES
Pt remains cooperative with assessment blood draw and EKG, continues to intermittent pace, speaking with soft tones and apologizing to staff for prior behavior.

## 2018-10-16 NOTE — CONSULTS
Spoke to Dr. Byrnes regarding consult. She was inquiring about inpatient bed availability in [facility] however there are no inpaitent beds open at this time.  Dr. Byrnes will look for transfer to outside facility when bed available and patient medically cleared. She denied need for further assistance from Psych CL.

## 2018-10-16 NOTE — ED NOTES
Pt informed of medical clearance and being transported to [facility], remains calm and okay with POC. Provided juice per request.

## 2018-10-17 NOTE — ED NOTES
Pt's family member, Radha Sawyer, was notified of pt's pending transfer to [facility], per pt request. Given [facility] contact info per pt request.

## 2018-10-17 NOTE — ED NOTES
Patient transferred to [facility] with [facility]. Report was called to Nurse Lilly and [facility]. Patient informed  of transfer to [facility]. Vitals stable.

## 2018-10-29 ENCOUNTER — PATIENT OUTREACH (OUTPATIENT)
Dept: ADMINISTRATIVE | Facility: CLINIC | Age: 53
End: 2018-10-29

## 2018-10-29 NOTE — PROGRESS NOTES
[phone] [facility]  [phone] [facility]  [phone] [facility]  C3 nurse attempted to contact patient. No answer. The following message was left for the patient to return the call:  Good afternoon  I am a nurse calling on behalf of [facility] from the [facility]ination [facility].  This is a Transitional Care Call for Thiago Rincon. When you have a moment please contact us at [phone] or [phone] Monday through Friday, between the hours of 8 am to 4 pm. We look forward to speaking with you. On behalf of [facility] have a nice day.    The patient has a scheduled HOSFU appointment with Dr Renea Dumont on 10/31/18 @ 1400 hrs.

## 2018-10-29 NOTE — PATIENT INSTRUCTIONS
Psychosis  Psychosis is a symptom of certain mental health problems. It involves perceiving reality differently from those around you. The difference between reality and what you think become blurred in your mind. Other mental health conditions, physical diseases, traumatic experiences, or drugs and toxins may bring on psychotic symptoms or behavior.  There are different kinds of psychosis:  · Drug-induced (due to alcohol, methamphetamine, cocaine, LSD, PCP and others)  · Bipolar disorder  · Depression  · Schizophrenia  · Dementia  Symptoms  The symptoms of psychosis may not all be the same for each person. However, they usually involve:  · Hallucinations. Seeing, hearing, feeling, or even tasting or smelling things that are not there  · Delusions. Believing something that is not true, or false beliefs that are not part of a person's [employer] or cultural background.  There may also be disturbances in thinking, speech and behavior, which can include:  · Hearing voices that others do not hear  · Seeing things that others do not see  · Racing thoughts  · Lack of energy  · Feeling very fearful  · Disorganized speech  · Intentional or unintentional bodily harm to others  · Paranoia  · Trouble thinking or concentrating clearly  · Depression, feeling suicidal  · Insomnia  · Withdrawal from those around you  Treatment for psychosis depends on the cause. Medicine, with or without psychotherapy, is often used.  Home care  · Find a healthcare provider and therapist who meet your needs.  Seek help when you feel like your symptoms are returning  · Be certain to tell each of your healthcare providers about all of the prescription drugs, over-the-counter medicines, and supplements you take. Certain supplements interact with medicines and can result in dangerous side effects. Ask your pharmacist when you have questions about drug interactions.  · Be sure to take your medicine as directed even if you think you don't need  it.  · Follow-up with lab tests as advised by your healthcare provider.  · Talk with your family about your feelings and thoughts. Ask them to help you recognize any behavior changes so you can get help and, if needed, medicines can be adjusted.  Follow-up care  Follow up with your counselor, therapist or psychiatrist as advised.  Call 911  Call 911 if you:  · Have suicidal thoughts, a suicide plan, and the means to carry out the plan  · Have troubled breathing  · Are very confused  · Are very drowsy or have trouble awakening  · Faint or lose consciousness  · Have a rapid heart rate, very low heart rate, or a new irregular heart rate  · Have a seizure  When to seek medical advice  Call your healthcare provider right away if any of these occur:  · Gradual or rapid return of psychotic symptoms  · Feeling like you want to harm yourself or another  · Feeling extremely depressed  · Feeling very anxious, agitated, or angry  · Feeling out of control or being controlled by others  · Unable to care for yourself  · Seeing things or hearing voices that you know aren't real  Date Last Reviewed: 9/29/2015  © 5709-4478 [employer]. [address] [place] [address]. All rights reserved. This information is not intended as a substitute for professional medical care. Always follow your healthcare professional's instructions.

## 2018-10-30 ENCOUNTER — NURSE TRIAGE (OUTPATIENT)
Dept: ADMINISTRATIVE | Facility: CLINIC | Age: 53
End: 2018-10-30

## 2018-10-30 NOTE — TELEPHONE ENCOUNTER
Reason for Disposition   Nursing judgment   Nursing judgment    Protocols used: ST NO GUIDELINE OR REFERENCE [id number]    Pt's gume is calling regarding pt. Gume states she doesn't think pt is on psychiatric medication since recent hospital discharge. Gume is not currently with pt. Sagrarioece advised that if she feels pt is a danger to herself or others she should bring pt back to ED. Gume also advised that if she doesn't think pt is a danger to self or others she can speak to PCP's office regarding care. Gume verbalizes understanding.

## 2019-03-08 DIAGNOSIS — Z12.39 BREAST CANCER SCREENING: ICD-10-CM

## 2020-10-05 ENCOUNTER — PATIENT MESSAGE (OUTPATIENT)
Dept: ADMINISTRATIVE | Facility: HOSPITAL | Age: 55
End: 2020-10-05

## 2021-10-25 ENCOUNTER — OFFICE VISIT (OUTPATIENT)
Dept: OBSTETRICS AND GYNECOLOGY | Facility: CLINIC | Age: 56
End: 2021-10-25
Payer: MEDICARE

## 2021-10-25 ENCOUNTER — LAB VISIT (OUTPATIENT)
Dept: LAB | Facility: HOSPITAL | Age: 56
End: 2021-10-25
Attending: NURSE PRACTITIONER
Payer: MEDICARE

## 2021-10-25 ENCOUNTER — PATIENT MESSAGE (OUTPATIENT)
Dept: OBSTETRICS AND GYNECOLOGY | Facility: CLINIC | Age: 56
End: 2021-10-25

## 2021-10-25 VITALS
SYSTOLIC BLOOD PRESSURE: 100 MMHG | WEIGHT: 141.13 LBS | HEIGHT: 64 IN | DIASTOLIC BLOOD PRESSURE: 72 MMHG | BODY MASS INDEX: 24.1 KG/M2

## 2021-10-25 DIAGNOSIS — Z78.0 MENOPAUSE: Primary | ICD-10-CM

## 2021-10-25 DIAGNOSIS — Z11.3 SCREENING FOR STD (SEXUALLY TRANSMITTED DISEASE): ICD-10-CM

## 2021-10-25 DIAGNOSIS — Z78.0 MENOPAUSE: ICD-10-CM

## 2021-10-25 LAB — HCG INTACT+B SERPL-ACNC: <1.2 MIU/ML

## 2021-10-25 PROCEDURE — 87389 HIV-1 AG W/HIV-1&-2 AB AG IA: CPT | Performed by: NURSE PRACTITIONER

## 2021-10-25 PROCEDURE — 86592 SYPHILIS TEST NON-TREP QUAL: CPT | Performed by: NURSE PRACTITIONER

## 2021-10-25 PROCEDURE — 99203 PR OFFICE/OUTPT VISIT, NEW, LEVL III, 30-44 MIN: ICD-10-PCS | Mod: S$PBB,,, | Performed by: NURSE PRACTITIONER

## 2021-10-25 PROCEDURE — 99999 PR PBB SHADOW E&M-EST. PATIENT-LVL IV: CPT | Mod: PBBFAC,,, | Performed by: NURSE PRACTITIONER

## 2021-10-25 PROCEDURE — 99214 OFFICE O/P EST MOD 30 MIN: CPT | Mod: PBBFAC | Performed by: NURSE PRACTITIONER

## 2021-10-25 PROCEDURE — 84702 CHORIONIC GONADOTROPIN TEST: CPT | Mod: DBM | Performed by: NURSE PRACTITIONER

## 2021-10-25 PROCEDURE — 80074 ACUTE HEPATITIS PANEL: CPT | Performed by: NURSE PRACTITIONER

## 2021-10-25 PROCEDURE — 99203 OFFICE O/P NEW LOW 30 MIN: CPT | Mod: S$PBB,,, | Performed by: NURSE PRACTITIONER

## 2021-10-25 PROCEDURE — 81025 URINE PREGNANCY TEST: CPT | Mod: PBBFAC | Performed by: NURSE PRACTITIONER

## 2021-10-25 PROCEDURE — 99999 PR PBB SHADOW E&M-EST. PATIENT-LVL IV: ICD-10-PCS | Mod: PBBFAC,,, | Performed by: NURSE PRACTITIONER

## 2021-10-25 PROCEDURE — 36415 COLL VENOUS BLD VENIPUNCTURE: CPT | Performed by: NURSE PRACTITIONER

## 2021-10-25 RX ORDER — DOCUSATE SODIUM 100 MG/1
100 CAPSULE, LIQUID FILLED ORAL DAILY
COMMUNITY
Start: 2021-06-09

## 2021-10-25 RX ORDER — LEVALBUTEROL TARTRATE 45 UG/1
1-2 AEROSOL, METERED ORAL
COMMUNITY
End: 2022-01-18 | Stop reason: ALTCHOICE

## 2021-10-25 RX ORDER — ASCORBIC ACID 250 MG
250 TABLET ORAL
COMMUNITY

## 2021-10-25 RX ORDER — CLOBETASOL PROPIONATE 0.5 MG/G
CREAM TOPICAL 2 TIMES DAILY
COMMUNITY
End: 2022-01-18 | Stop reason: ALTCHOICE

## 2021-10-25 RX ORDER — GUAIFENESIN/DEXTROMETHORPHAN 100-10MG/5
LIQUID (ML) ORAL
COMMUNITY
Start: 2021-06-09 | End: 2022-01-18 | Stop reason: ALTCHOICE

## 2021-10-25 RX ORDER — DIVALPROEX SODIUM 500 MG/1
500 TABLET, FILM COATED, EXTENDED RELEASE ORAL NIGHTLY
COMMUNITY
Start: 2021-06-09 | End: 2022-01-18 | Stop reason: ALTCHOICE

## 2021-10-25 RX ORDER — OXCARBAZEPINE 300 MG/1
300 TABLET, FILM COATED ORAL
COMMUNITY
Start: 2021-08-27 | End: 2022-01-18 | Stop reason: ALTCHOICE

## 2021-10-25 RX ORDER — RISPERIDONE 2 MG/1
TABLET ORAL
COMMUNITY
Start: 2021-06-09 | End: 2022-01-18 | Stop reason: ALTCHOICE

## 2021-10-25 RX ORDER — CLONAZEPAM 1 MG/1
1 TABLET ORAL 3 TIMES DAILY
COMMUNITY
Start: 2021-07-16 | End: 2022-01-18 | Stop reason: ALTCHOICE

## 2021-10-25 RX ORDER — MULTIVITAMIN
1 TABLET ORAL DAILY
COMMUNITY

## 2021-10-25 RX ORDER — OXCARBAZEPINE 300 MG/1
300 TABLET, FILM COATED ORAL 3 TIMES DAILY
COMMUNITY
Start: 2021-07-16 | End: 2022-01-18

## 2021-10-25 RX ORDER — ARIPIPRAZOLE 10 MG/1
10 TABLET ORAL DAILY
COMMUNITY
Start: 2021-10-08 | End: 2022-01-18 | Stop reason: ALTCHOICE

## 2021-10-25 RX ORDER — ALBUTEROL SULFATE 90 UG/1
AEROSOL, METERED RESPIRATORY (INHALATION)
COMMUNITY
Start: 2021-06-14

## 2021-10-25 RX ORDER — SIMETHICONE 40MG/0.6ML
1 SUSPENSION, DROPS(FINAL DOSAGE FORM)(ML) ORAL DAILY
COMMUNITY
Start: 2021-06-09 | End: 2022-01-18 | Stop reason: ALTCHOICE

## 2021-10-26 ENCOUNTER — PATIENT MESSAGE (OUTPATIENT)
Dept: OBSTETRICS AND GYNECOLOGY | Facility: CLINIC | Age: 56
End: 2021-10-26
Payer: MEDICARE

## 2021-10-26 LAB
HAV IGM SERPL QL IA: NEGATIVE
HBV CORE IGM SERPL QL IA: NEGATIVE
HBV SURFACE AG SERPL QL IA: NEGATIVE
HCV AB SERPL QL IA: NEGATIVE
HIV 1+2 AB+HIV1 P24 AG SERPL QL IA: NEGATIVE
RPR SER QL: NORMAL

## 2022-01-18 ENCOUNTER — LAB VISIT (OUTPATIENT)
Dept: LAB | Facility: HOSPITAL | Age: 57
End: 2022-01-18
Attending: NURSE PRACTITIONER
Payer: MEDICARE

## 2022-01-18 ENCOUNTER — OFFICE VISIT (OUTPATIENT)
Dept: OBSTETRICS AND GYNECOLOGY | Facility: CLINIC | Age: 57
End: 2022-01-18
Payer: MEDICARE

## 2022-01-18 VITALS
WEIGHT: 156.5 LBS | DIASTOLIC BLOOD PRESSURE: 80 MMHG | BODY MASS INDEX: 26.72 KG/M2 | HEIGHT: 64 IN | SYSTOLIC BLOOD PRESSURE: 118 MMHG

## 2022-01-18 DIAGNOSIS — Z78.0 MENOPAUSE: ICD-10-CM

## 2022-01-18 DIAGNOSIS — Z78.0 MENOPAUSE: Primary | ICD-10-CM

## 2022-01-18 LAB — FSH SERPL-ACNC: 38.43 MIU/ML

## 2022-01-18 PROCEDURE — 99213 OFFICE O/P EST LOW 20 MIN: CPT | Mod: PBBFAC | Performed by: NURSE PRACTITIONER

## 2022-01-18 PROCEDURE — 99213 PR OFFICE/OUTPT VISIT, EST, LEVL III, 20-29 MIN: ICD-10-PCS | Mod: S$PBB,,, | Performed by: NURSE PRACTITIONER

## 2022-01-18 PROCEDURE — 99999 PR PBB SHADOW E&M-EST. PATIENT-LVL III: CPT | Mod: PBBFAC,,, | Performed by: NURSE PRACTITIONER

## 2022-01-18 PROCEDURE — 99213 OFFICE O/P EST LOW 20 MIN: CPT | Mod: S$PBB,,, | Performed by: NURSE PRACTITIONER

## 2022-01-18 PROCEDURE — 99999 PR PBB SHADOW E&M-EST. PATIENT-LVL III: ICD-10-PCS | Mod: PBBFAC,,, | Performed by: NURSE PRACTITIONER

## 2022-01-18 PROCEDURE — 83001 ASSAY OF GONADOTROPIN (FSH): CPT | Performed by: NURSE PRACTITIONER

## 2022-01-18 PROCEDURE — 36415 COLL VENOUS BLD VENIPUNCTURE: CPT | Performed by: NURSE PRACTITIONER

## 2022-01-18 RX ORDER — TRAZODONE HYDROCHLORIDE 50 MG/1
TABLET ORAL
COMMUNITY
Start: 2022-01-05

## 2022-01-18 RX ORDER — MOMETASONE FUROATE 50 UG/1
SPRAY, METERED NASAL
COMMUNITY
Start: 2021-11-01

## 2022-01-18 RX ORDER — HYDROXYZINE HYDROCHLORIDE 50 MG/1
50 TABLET, FILM COATED ORAL 3 TIMES DAILY PRN
COMMUNITY
Start: 2021-11-29

## 2022-01-18 NOTE — PROGRESS NOTES
"  Thiago Rincon is a 56 y.o. female  presents for discuss birth control as she is starting a sexual relatinship with new partner.  She is having cycles monthly very light for 2 days.  On Lithum for her psych issues.    LMP: Patient's last menstrual period was 2022..      Past Medical History:   Diagnosis Date    Abnormal Pap smear of cervix     Alcohol abuse     Allergy     Anxiety     Bipolar disorder     Borderline personality disorder     Depression     History of psychiatric hospitalization     " a bizillion times"    History of suicidal tendencies     Hx of psychiatric care     Hypertension     Mood disorder     Psoriasis     Psychiatric problem     PTSD (post-traumatic stress disorder)     Schizoaffective disorder     Sleep difficulties     Suicide attempt     "tried to take pills, and slit my wrist. I couldnt tell ya when."    Therapy     Withdrawal symptoms, alcohol     Withdrawal symptoms, drug or narcotic      Past Surgical History:   Procedure Laterality Date    CERVICAL CONIZATION   W/ LASER      COLONOSCOPY N/A 2017    Procedure: COLONOSCOPY;  Surgeon: Izaiah Bond MD;  Location: [facility];  Service: Endoscopy;  Laterality: N/A;    vocal cord nodules       Social History     Socioeconomic History    Marital status:     Number of children: 0   Tobacco Use    Smoking status: Current Every Day Smoker     Packs/day: 0.25     Years: 5.00     Pack years: 1.25     Types: Cigarettes    Smokeless tobacco: Never Used   Substance and Sexual Activity    Alcohol use: Yes     Comment: I drink every six months socially    Drug use: No    Sexual activity: Not Currently     Partners: Male     Birth control/protection: Condom   Other Topics Concern    Patient feels they ought to cut down on drinking/drug use Yes    Patient annoyed by others criticizing their drinking/drug use Yes    Patient has felt bad or guilty about drinking/drug use Yes    Patient has " "had a drink/used drugs as an eye opener in the AM Yes   Social History Narrative    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 Psychiatrist in [place].     Family History   Problem Relation Age of Onset    Hypertension Father     Allergies Father     Bipolar disorder Father     Depression Father     Colon cancer Mother     Hypertension Mother     Allergies Mother     Alcohol abuse Mother     Anxiety disorder Mother     Depression Mother     Cancer Mother     Depression Brother     Drug abuse Brother     Colon cancer Maternal Aunt     Alcohol abuse Maternal Aunt     Depression Maternal Aunt     Bipolar disorder Cousin     Depression Cousin     Breast cancer Neg Hx     Ovarian cancer Neg Hx      OB History        0    Para   0    Term   0       0    AB   0    Living   0       SAB   0    IAB   0    Ectopic   0    Multiple   0    Live Births                     /80   Ht 5' 4" (1.626 m)   Wt 71 kg (156 lb 8.4 oz)   LMP 2022   BMI 26.87 kg/m²       ROS:  GENERAL: Denies weight gain or weight loss. Feeling well overall.   SKIN: Denies rash or lesions.   HEAD: Denies head injury or headache.   NODES: Denies enlarged lymph nodes.   CHEST: Denies chest pain or shortness of breath.   CARDIOVASCULAR: Denies palpitations or left sided chest pain.   ABDOMEN: No abdominal pain, constipation, diarrhea, nausea, vomiting or rectal bleeding.   URINARY: No frequency, dysuria, hematuria, or burning on urination.  REPRODUCTIVE: See HPI.   BREASTS: The patient performs breast self-examination and denies pain, lumps, or nipple discharge.   HEMATOLOGIC: No easy bruisability or excessive bleeding.   MUSCULOSKELETAL: Denies joint pain or swelling.   NEUROLOGIC: Denies syncope or weakness.   PSYCHIATRIC: Denies depression, anxiety or mood swings.    PHYSICAL EXAM:  APPEARANCE: Well nourished, well developed, in no acute distress.  AFFECT: WNL, alert and oriented x 3    Pt refuses annual exam " today    Physical Exam    1. Menopause  Follicle Stimulating Hormone    AND PLAN:    Discussed menopause vs ocp use  Will check [facility]  Recommend condoms

## 2022-01-19 ENCOUNTER — PATIENT MESSAGE (OUTPATIENT)
Dept: OBSTETRICS AND GYNECOLOGY | Facility: CLINIC | Age: 57
End: 2022-01-19
Payer: MEDICARE

## 2022-01-25 ENCOUNTER — TELEPHONE (OUTPATIENT)
Dept: OBSTETRICS AND GYNECOLOGY | Facility: CLINIC | Age: 57
End: 2022-01-25
Payer: MEDICARE

## 2022-01-25 NOTE — TELEPHONE ENCOUNTER
----- Message from Bobbi Wilkins sent at 1/25/2022  8:45 AM CST -----  Contact: [phone]  Name of test: test results    Date of test: 01/18/22    Ordering provider:YANET Coyle    Where was the test performed: [facility]    Comments:  Patient called, would like a courtesy call in regards her test results. Thank you

## 2022-01-25 NOTE — TELEPHONE ENCOUNTER
Message concerning labs was sent through the portal on 1/19/2022      Labs show you are in menopausal range. These numbers can fluctuate.  Recommendations are to use condoms especially if in new relationship to prevent other issues such as sexually transmitted diseases.

## 2022-01-25 NOTE — TELEPHONE ENCOUNTER
Spoke with patient notified lab results and recommendations.Patient questioned if this means she can not get pregnant,Mrs Coyle informed patient that she can't tell her 100% that she will not get pregnant and offered a form of contraception,Patient stated that she will think about it and call back.

## 2022-02-01 ENCOUNTER — OFFICE VISIT (OUTPATIENT)
Dept: OBSTETRICS AND GYNECOLOGY | Facility: CLINIC | Age: 57
End: 2022-02-01
Payer: MEDICARE

## 2022-02-01 VITALS — BODY MASS INDEX: 25.67 KG/M2 | WEIGHT: 150.38 LBS | HEIGHT: 64 IN

## 2022-02-01 DIAGNOSIS — N76.0 BV (BACTERIAL VAGINOSIS): Primary | ICD-10-CM

## 2022-02-01 DIAGNOSIS — B96.89 BV (BACTERIAL VAGINOSIS): Primary | ICD-10-CM

## 2022-02-01 DIAGNOSIS — Z11.3 SCREENING FOR STD (SEXUALLY TRANSMITTED DISEASE): ICD-10-CM

## 2022-02-01 PROCEDURE — 87210 SMEAR WET MOUNT SALINE/INK: CPT | Mod: PBBFAC | Performed by: NURSE PRACTITIONER

## 2022-02-01 PROCEDURE — 99213 OFFICE O/P EST LOW 20 MIN: CPT | Mod: PBBFAC | Performed by: NURSE PRACTITIONER

## 2022-02-01 PROCEDURE — 87491 CHLMYD TRACH DNA AMP PROBE: CPT | Performed by: NURSE PRACTITIONER

## 2022-02-01 PROCEDURE — 87591 N.GONORRHOEAE DNA AMP PROB: CPT | Performed by: NURSE PRACTITIONER

## 2022-02-01 PROCEDURE — 99213 OFFICE O/P EST LOW 20 MIN: CPT | Mod: S$PBB,,, | Performed by: NURSE PRACTITIONER

## 2022-02-01 PROCEDURE — 99999 PR PBB SHADOW E&M-EST. PATIENT-LVL III: CPT | Mod: PBBFAC,,, | Performed by: NURSE PRACTITIONER

## 2022-02-01 PROCEDURE — 99999 PR PBB SHADOW E&M-EST. PATIENT-LVL III: ICD-10-PCS | Mod: PBBFAC,,, | Performed by: NURSE PRACTITIONER

## 2022-02-01 PROCEDURE — 99213 PR OFFICE/OUTPT VISIT, EST, LEVL III, 20-29 MIN: ICD-10-PCS | Mod: S$PBB,,, | Performed by: NURSE PRACTITIONER

## 2022-02-01 RX ORDER — APREMILAST 30 MG/1
1 TABLET, FILM COATED ORAL 2 TIMES DAILY
COMMUNITY
Start: 2022-01-31

## 2022-02-01 RX ORDER — METRONIDAZOLE 500 MG/1
500 TABLET ORAL EVERY 12 HOURS
Qty: 14 TABLET | Refills: 0 | Status: SHIPPED | OUTPATIENT
Start: 2022-02-01 | End: 2022-02-08

## 2022-02-01 NOTE — PATIENT INSTRUCTIONS
"Patient Education       Bacterial Vaginosis   The Basics   Written by the doctors and editors at [facility]   What is bacterial vaginosis? -- Bacterial vaginosis is an infection in the vagina that can cause bad-smelling vaginal discharge. "Vaginal discharge" is the term doctors and nurses use to describe any fluid that comes out of the vagina (figure 1). Some amount of vaginal discharge is normal. But people with bacterial vaginosis can have a lot of vaginal discharge, or vaginal discharge that smells bad.  Bacterial vaginosis is caused by certain bacteria (germs). The vagina normally has different types of bacteria in it. But when the amounts or the types of bacteria change, an infection can happen.  Bacterial vaginosis usually affects people who are, or have been, sexually active. Your risk of getting it increases if you have a new sex partner or more than one partner. This is true whether your partners are male or female.  If you have bacterial vaginosis, you have a higher chance of catching other infections that are spread through sex. You can lower this risk by using condoms when you have sex.  What are the symptoms of bacterial vaginosis? -- Some people with bacterial vaginosis have no symptoms. When symptoms do happen, they often include a "fishy-smelling" vaginal discharge. The discharge is watery and off-white or gray. The smell might be more noticeable:  · During your period  · After sex with a male partner - This happens when semen (the fluid that is released during sex) mixes with your vaginal fluids.  You might also notice a burning feeling in your vagina.  Is there a test for bacterial vaginosis? -- Yes. Your doctor or nurse will do an exam. They will also take a sample of your vaginal discharge, and do lab tests on it to look for an infection.  How is bacterial vaginosis treated? -- Bacterial vaginosis is treated with medicine. The 2 medicines most often used are:  · Metronidazole  · Clindamycin  Both " of these medicines come in different forms. They can come as a pill that you swallow or as a gel or cream that you put inside your vagina. Most people have fewer side effects when they use the gel or cream treatment. But you and your doctor or nurse will decide which medicine and which form is right for you.  It is important that you take all of the medicine your doctor or nurse prescribes, even if your symptoms go away after a few doses. Taking all of your medicine can help prevent the symptoms from coming back.  Do my sex partners need to be treated if I have bacterial vaginosis? -- It depends. If your sex partner does not have a vagina, they do not need to be treated if you have bacterial vaginosis. But if your partner does have a vagina, you should tell them about your bacterial vaginosis. That way they can be treated.  What happens if my symptoms come back? -- Once you have had bacterial vaginosis, it can come back, even if you are not having sex. If your symptoms come back, let your doctor or nurse know. You might need treatment with more medicine.  Some people get bacterial vaginosis over and over again. If this happens to you, your doctor might suggest taking medicine for 3 to 6 months. This might help to prevent future infections.  What if I am pregnant and have symptoms of bacterial vaginosis? -- If you are pregnant and have symptoms of bacterial vaginosis, tell your doctor or nurse. You might need treatment with medicine.  Can bacterial vaginosis be prevented? -- Sometimes. You can help prevent bacterial vaginosis by using condoms when you have sex.   You can also avoid things that increase the risk of bacterial vaginosis. For example:  · Avoid douching (putting liquid inside your vagina to rinse it out)  · Do not smoke, or try to quit if you already smoke  · Avoid sharing sex toys, and clean toys between uses   All topics are updated as new evidence becomes available and our peer review process is  "complete.  This topic retrieved from [employer] on: Sep 21, 2021.  Topic 04191 Version 9.0  Release: 29.4.2 - C29.263  © 2021 UpToDate, Inc. and/or its affiliates. All rights reserved.  figure 1: Adult female external genitalia     This drawing shows the different parts of the genitals.  Graphic 85313 Version 9.0    Consumer Information Use and Disclaimer   This information is not specific medical advice and does not replace information you receive from your health care provider. This is only a brief summary of general information. It does NOT include all information about conditions, illnesses, injuries, tests, procedures, treatments, therapies, discharge instructions or life-style choices that may apply to you. You must talk with your health care provider for complete information about your health and treatment options. This information should not be used to decide whether or not to accept your health care provider's advice, instructions or recommendations. Only your health care provider has the knowledge and training to provide advice that is right for you. The use of this information is governed by the [employer] End User License Agreement, available at https://www.[employer]/en/solutions/[employer]/about/guru.The use of [employer] content is governed by the [employer] Terms of Use. ©2021 UpToDate, Inc. All rights reserved.  Copyright   © 2021 UpToDate, Inc. and/or its affiliates. All rights reserved.  Patient Education       Screening for Sexually Transmitted Infections   The Basics   Written by the doctors and editors at [employer]   What are sexually transmitted infections? -- Sexually transmitted infections, often called STIs, are infections you can catch during sex. They are also called sexually transmitted diseases, or STDs. Some STIs are caused by bacteria, and others are caused by viruses.  The most common STIs include:  · Chlamydia  · Gonorrhea  · Mycoplasma genitalium  · Genital herpes, also called "herpes " "simplex virus" or "HSV"  · Genital warts, also called "human papillomavirus" or "HPV" - Some types of HPV can cause cervical cancer in women.  · Hepatitis B  · Syphilis  · Trichomoniasis  · Human immunodeficiency virus, also called "HIV" - This is the virus that causes AIDS.  Many of these infections can be transmitted through any type of sex. That includes not just vaginal or anal sex, but also oral sex and other types of sex play. HIV and hepatitis B can be transmitted in other ways, too, such as exposure to body fluids.  Hepatitis A and hepatitis C are other types of hepatitis viruses. They are usually transmitted in other ways, but can also be transmitted through sex.   What is STI screening? -- STI screening includes a series of tests that doctors use to find out if a person has any STIs. STIs often don't cause any symptoms. People can have STIs and not know it. That's what makes screening so important.  Doctors recommend that people who are at risk for STIs be screened even if they have no symptoms and feel fine. For example, you could be at risk for chlamydia if you had unprotected sex with a new partner. Screening for chlamydia will alert your doctor that you have this infection. Treatment will prevent the infection from getting worse and keep you from infecting other people.  There are different types of tests that screen for different infections. Many STIs can be found through a blood or urine test. If you decide to be screened for STIs, your doctor or nurse can work with you to figure out which specific tests you need.  Who should be screened for STIs? -- Different screening tests are appropriate for different people, depending on their gender and sex habits.  · All men and women (including teenagers) should get screened at least once for HIV.  · All girls and women younger than 25 years who have had sex should be screened every year for gonorrhea and chlamydia.  · Women older than 25 who have sex with " more than one partner and do not use condoms should be screened every year for gonorrhea and chlamydia.  · Men who have sex with men should be screened at least once a year for HIV, syphilis, chlamydia, and gonorrhea. This should involve testing of any body parts that could be infected, including the rectum. Men who have sex with men should also be screened at least once for hepatitis A, B, and C.   · Pregnant women should be screened for syphilis, HIV, and hepatitis B. They should also be screened for chlamydia and gonorrhea if they are younger than 25 years old or having sex with more than one partner. Some pregnant women might also need to be screened for other infections depending on their sex habits.  · Men and women who are infected with HIV should be screened at least once for hepatitis A, B, and C. They should also be screened at least once a year for syphilis, chlamydia, and gonorrhea. Women who are infected with HIV should be screened at least once a year for trichomonas. Men who are infected with HIV, and who have sex with men with HIV, should be screened at least once a year for hepatitis C.  The list above includes some general guidelines, but some people might need other screening tests depending on their sex habits and other factors. If you are unsure whether you should be screened and for what, ask your doctor or nurse for advice.  Where can I get screened? -- If you have a doctor or nurse you see regularly, he or she should be able to screen you. But if you prefer to have screening done without your regular doctor or nurse, or if you don't have one, you can go to a clinic. To find a clinic near you, check with your local Department of Health or visit [email or web address]. Some clinics let you get screened without giving your name (anonymously).  Be careful with any pharmacies or online stores that offer to sell you kits to use at home to screen for STIs. For some of these tests, you turn in or  "mail away a sample, and then you get the results either by phone or online. For others, you do a test at home and get results within an hour. But it is not always clear which test kits are ones you can trust. If you do use one of these kits and get a positive result, be sure to follow up with a doctor or nurse. And if you get a negative result but think you might have an infection, see a doctor or nurse.  What symptoms should I watch for? -- In general, watch out for any genital itching, burning, sores, or discharge. But be aware that many STIs do not cause any symptoms. The best way to know for sure if you have an STI is to be screened.  What if I have an STI? -- If you have an STI, you will need treatment. The right treatment will depend on the type of STI you have. Treatment might include antibiotics or medicines called antivirals, which fight viruses. Treatment can cure your infection or keep it from getting worse. It can also reduce the chances that you spread your infection to others.  If you do have an infection, you might need to tell the people you could have infected. Your doctor or nurse can help you figure out which partners you need to tell based on when you last had sex with them.  Can STIs be prevented? -- There is no surefire way to prevent all STIs, but there are things you can do to reduce your chances of catching one.  · The most important thing you can do is to wear a condom every time you have sex. Both male and female condoms can protect against STIs. But be aware that male condoms made out of "natural materials," such as sheep intestine, do not protect against STIs.  · Ask your doctor if there are any vaccines you should have. If you are 26 years old or younger, you can get a vaccine to protect against HPV, the virus that causes genital warts. If you do not have hepatitis A or B and have not already gotten the vaccine for hepatitis A or B, you can get those vaccines, too.  · If your partner " has herpes, he or she can reduce the chances of infecting you by taking a medicine called valacyclovir (brand name: Valtrex).  · If you are at very high risk of catching HIV, you might be able to take a pill every day to reduce the chances that you will get HIV. If you are interested in this, talk to your doctor.  All topics are updated as new evidence becomes available and our peer review process is complete.  This topic retrieved from [facility] on: Sep 21, 2021.  Topic 45424 Version 13.0  Release: 29.4.2 - C29.263  © 2021 UpToDate, Inc. and/or its affiliates. All rights reserved.  Consumer Information Use and Disclaimer   This information is not specific medical advice and does not replace information you receive from your health care provider. This is only a brief summary of general information. It does NOT include all information about conditions, illnesses, injuries, tests, procedures, treatments, therapies, discharge instructions or life-style choices that may apply to you. You must talk with your health care provider for complete information about your health and treatment options. This information should not be used to decide whether or not to accept your health care provider's advice, instructions or recommendations. Only your health care provider has the knowledge and training to provide advice that is right for you. The use of this information is governed by the [employer] ([employer]CD[employer]) End User License Agreement, available at https://www.[employer]/en/solutions/[employer]post/about/guru.The use of [facility] content is governed by the [facility] Terms of Use. ©2021 UpToDate, Inc. All rights reserved.  Copyright   © 2021 UpToDate, Inc. and/or its affiliates. All rights reserved.  Patient Education       Vaginitis   About this topic   Vaginitis is the redness and swelling of the vagina and the area outside of the vagina. The condition may also be called vulvovaginitis. Your vagina joins the uterus or womb with the outside of  your body.  What are the causes?   Vaginitis may be caused by:  · An infection  · Irritation from soaps, bubble baths, douches, or other chemicals  · Not being clean  · A change in hormones with pregnancy, breastfeeding, or menopause  · Drugs like antibiotics or steroids  · Sexual intercourse  · Diabetes that is not controlled  · A weak immune system  What can make this more likely to happen?   Vaginitis is common in women. You are more likely to have vaginitis if you:  · Take bubble baths  · Use feminine hygiene sprays or perfumed soaps  · Wear clothes that are tight or wet  · Do not dry your vaginal area well after taking a bath  · Have sex without condoms  What are the main signs?   Your vaginal area may itch, burn, or feel painful. You may have some kind of discharge from your vagina or there may be none at all. You may notice redness, swelling, or cracks of the skin in the vaginal area. There may be a bad smell coming from your vaginal area. You may feel pain during sex.  How does the doctor diagnose this health problem?   Your doctor will take your history. Your doctor will do an exam of the pelvic area. Based on the problem, your doctor may need to do a pelvic exam. The doctor may order:  · Lab tests  · Urine tests  How does the doctor treat this health problem?   Your care depends on the cause of your vaginitis.  What drugs may be needed?   The doctor may order drugs to:  · Help with pain and swelling  · Fight an infection  What can be done to prevent this health problem?   · Do not wear clothes that may hold moisture, such as nylon or polyester. Wear cotton underwear.  · Wear loose-fitting pants or other clothes. Avoid wearing underwear while you sleep. This can help keep your vaginal area dry.  · Clean your vaginal area with water. If you use soap, be sure it is mild and rinse well. Pat the area dry with a clean towel. Do not use bubble baths or douche.  · Wipe from front to back after using the  toilet.  · If you have sex, use latex condoms each time to lower spread of infection. Avoid multiple sex partners.  Where can I learn more?   American Academy of Family Physicians  https://familydoctor.org/condition/vaginitis/   American Congress of Obstetricians and Gynecologists  https://www.acog.org/Patients/FAQs/Vaginitis   Centers for Disease Control and Prevention  https://www.cdc.gov/fungal/diseases/candidiasis/genital/   Centers for Disease Control and Prevention  https://www.cdc.gov/std/bv/   Last Reviewed Date   2019-10-18  Consumer Information Use and Disclaimer   This information is not specific medical advice and does not replace information you receive from your health care provider. This is only a brief summary of general information. It does NOT include all information about conditions, illnesses, injuries, tests, procedures, treatments, therapies, discharge instructions or life-style choices that may apply to you. You must talk with your health care provider for complete information about your health and treatment options. This information should not be used to decide whether or not to accept your health care providers advice, instructions or recommendations. Only your health care provider has the knowledge and training to provide advice that is right for you.  Copyright   Copyright © 2021 UpToDate, Inc. and its affiliates and/or licensors. All rights reserved.

## 2022-02-01 NOTE — PROGRESS NOTES
"Thiago Rincon is a 56 y.o. female  presents with complaint of vaginal discharge for past few days.  She denies itching.  reports odor.  She states the discharge is yellow.    In newer sexually relationship not sure if he is using condoms.     Past Medical History:   Diagnosis Date    Abnormal Pap smear of cervix     Alcohol abuse     Allergy     Anxiety     Bipolar disorder     Borderline personality disorder     Depression     History of psychiatric hospitalization     " a bizillion times"    History of suicidal tendencies     Hx of psychiatric care     Hypertension     Mood disorder     Psoriasis     Psychiatric problem     PTSD (post-traumatic stress disorder)     Schizoaffective disorder     Sleep difficulties     Suicide attempt     "tried to take pills, and slit my wrist. I couldnt tell ya when."    Therapy     Withdrawal symptoms, alcohol     Withdrawal symptoms, drug or narcotic      Past Surgical History:   Procedure Laterality Date    CERVICAL CONIZATION   W/ LASER      COLONOSCOPY N/A 2017    Procedure: COLONOSCOPY;  Surgeon: Izaiah Bond MD;  Location: [facility];  Service: Endoscopy;  Laterality: N/A;    vocal cord nodules       Social History     Tobacco Use    Smoking status: Current Every Day Smoker     Packs/day: 0.25     Years: 5.00     Pack years: 1.25     Types: Cigarettes    Smokeless tobacco: Never Used   Substance Use Topics    Alcohol use: Yes     Comment: I drink every six months socially    Drug use: No     Family History   Problem Relation Age of Onset    Hypertension Father     Allergies Father     Bipolar disorder Father     Depression Father     Colon cancer Mother     Hypertension Mother     Allergies Mother     Alcohol abuse Mother     Anxiety disorder Mother     Depression Mother     Cancer Mother     Depression Brother     Drug abuse Brother     Colon cancer Maternal Aunt     Alcohol abuse Maternal Aunt     Depression " "Maternal Aunt     Bipolar disorder Cousin     Depression Cousin     Breast cancer Neg Hx     Ovarian cancer Neg Hx      OB History    Para Term  AB Living   0 0 0 0 0 0   SAB IAB Ectopic Multiple Live Births   0 0 0 0         Ht 5' 4" (1.626 m)   Wt 68.2 kg (150 lb 5.7 oz)   LMP 2022   BMI 25.81 kg/m²     ROS:  Per hpi    PHYSICAL EXAM:  VULVA: normal appearing vulva with no masses, tenderness or lesions, VAGINA: vaginal discharge - creamy, WET MOUNT done - results: clue cells, CERVIX: normal appearing cervix without discharge or lesions, DNA probe for chlamydia and GC obtained, UTERUS: uterus is normal size, shape, consistency and nontender, ADNEXA: normal adnexa in size, nontender and no masses    ASSESSMENT and PLAN:  1. BV (bacterial vaginosis)  POCT Wet Prep    metroNIDAZOLE (FLAGYL) 500 MG tablet    C. trachomatis/N. gonorrhoeae by AMP DNA   2. Screening for STD (sexually transmitted disease)  POCT Wet Prep    C. trachomatis/N. gonorrhoeae by AMP DNA       Patient was counseled today on vaginitis prevention including :  a. avoiding feminine products such as deoderant soaps, body wash, bubble bath, douches, scented toilet paper, deoderant tampons or pads, feminine wipes, chronic pad use, etc.  b. avoiding other vulvovaginal irritants such as long hot baths, humidity, tight, synthetic clothing, chlorine and sitting around in wet bathing suits  c. wearing cotton underwear, avoiding thong underwear and no underwear to bed  d. taking showers instead of baths and use a hair dryer on cool setting afterwards to dry  e. wearing cotton to exercise and shower immediately after exercise and change clothes  f. using polyurethane condoms without spermicide if sexually active and symptoms are triggered by intercourse        "

## 2022-02-03 ENCOUNTER — PATIENT MESSAGE (OUTPATIENT)
Dept: OBSTETRICS AND GYNECOLOGY | Facility: CLINIC | Age: 57
End: 2022-02-03
Payer: MEDICARE

## 2022-02-03 LAB
C TRACH DNA SPEC QL NAA+PROBE: NOT DETECTED
N GONORRHOEA DNA SPEC QL NAA+PROBE: NOT DETECTED

## 2022-02-08 ENCOUNTER — TELEPHONE (OUTPATIENT)
Dept: OBSTETRICS AND GYNECOLOGY | Facility: CLINIC | Age: 57
End: 2022-02-08
Payer: MEDICARE

## 2022-02-08 NOTE — TELEPHONE ENCOUNTER
Pt was transferred from  stating that she wanted results from STD screening.     Confirmed two pt identifiers. Advised pt of GC/CT results. Pt voiced understanding.

## 2024-06-25 PROBLEM — T56.891A LITHIUM TOXICITY: Status: ACTIVE | Noted: 2024-06-25

## 2024-06-25 PROBLEM — R93.0 ABNORMAL HEAD CT: Status: ACTIVE | Noted: 2024-06-25

## 2024-06-25 PROBLEM — F25.9 SCHIZOAFFECTIVE DISORDER: Status: ACTIVE | Noted: 2024-06-25

## 2024-06-25 PROBLEM — F17.200 NICOTINE DEPENDENCE: Status: ACTIVE | Noted: 2024-06-25

## 2024-08-22 ENCOUNTER — LAB VISIT (OUTPATIENT)
Dept: LAB | Facility: HOSPITAL | Age: 59
End: 2024-08-22
Attending: STUDENT IN AN ORGANIZED HEALTH CARE EDUCATION/TRAINING PROGRAM
Payer: MEDICARE

## 2024-08-22 ENCOUNTER — OFFICE VISIT (OUTPATIENT)
Dept: NEUROLOGY | Facility: CLINIC | Age: 59
End: 2024-08-22
Payer: MEDICARE

## 2024-08-22 VITALS
HEART RATE: 73 BPM | SYSTOLIC BLOOD PRESSURE: 108 MMHG | HEIGHT: 64 IN | WEIGHT: 123.69 LBS | DIASTOLIC BLOOD PRESSURE: 67 MMHG | BODY MASS INDEX: 21.11 KG/M2

## 2024-08-22 DIAGNOSIS — R41.82 ALTERED MENTAL STATUS, UNSPECIFIED ALTERED MENTAL STATUS TYPE: ICD-10-CM

## 2024-08-22 DIAGNOSIS — F31.9 BIPOLAR DEPRESSION: Primary | ICD-10-CM

## 2024-08-22 LAB
T3 SERPL-MCNC: 67 NG/DL (ref 60–180)
TSH SERPL DL<=0.005 MIU/L-ACNC: 0.56 UIU/ML (ref 0.4–4)

## 2024-08-22 PROCEDURE — 84480 ASSAY TRIIODOTHYRONINE (T3): CPT | Performed by: STUDENT IN AN ORGANIZED HEALTH CARE EDUCATION/TRAINING PROGRAM

## 2024-08-22 PROCEDURE — 99999 PR PBB SHADOW E&M-EST. PATIENT-LVL III: CPT | Mod: PBBFAC,,, | Performed by: STUDENT IN AN ORGANIZED HEALTH CARE EDUCATION/TRAINING PROGRAM

## 2024-08-22 PROCEDURE — 99213 OFFICE O/P EST LOW 20 MIN: CPT | Mod: PBBFAC,PN | Performed by: STUDENT IN AN ORGANIZED HEALTH CARE EDUCATION/TRAINING PROGRAM

## 2024-08-22 PROCEDURE — 99204 OFFICE O/P NEW MOD 45 MIN: CPT | Mod: S$PBB,,, | Performed by: STUDENT IN AN ORGANIZED HEALTH CARE EDUCATION/TRAINING PROGRAM

## 2024-08-22 PROCEDURE — 84436 ASSAY OF TOTAL THYROXINE: CPT | Performed by: STUDENT IN AN ORGANIZED HEALTH CARE EDUCATION/TRAINING PROGRAM

## 2024-08-22 PROCEDURE — 36415 COLL VENOUS BLD VENIPUNCTURE: CPT | Performed by: STUDENT IN AN ORGANIZED HEALTH CARE EDUCATION/TRAINING PROGRAM

## 2024-08-22 PROCEDURE — 84443 ASSAY THYROID STIM HORMONE: CPT | Performed by: STUDENT IN AN ORGANIZED HEALTH CARE EDUCATION/TRAINING PROGRAM

## 2024-08-22 NOTE — PROGRESS NOTES
[facility]  Clinic Note    Date of Service: 8/22/2024  Patient seen at the request of: Frannie Clemente MD    Reason for Consultation  Memory concerns    Assessment:  Thiago Rincon is a 58 y.o. female who presents with memory concerns.    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  Patient is followed closely by Psychiatry and is currently on a regimen of Invega Sustenna and Topamax at night.  She is currently very happy with her regimen and reports stability since her hospitalization in June of 2024.  During that hospitalization, she presented with paranoid delusions.  As a result, the patient was taken off of lithium and placed on her current regimen.    She presents today due to memory concerns and some history of a tremor in her left hand.  We were able to obtain a baseline Martin today which was scored a 22/30.  Notable deficits were in delayed recall and fluency.  She reports that she has difficulty with short-term memory as a chronic issue and this has not been progressive.  She denies any history of episodes of loss of consciousness.  I reviewed the patient's MRI brain and agree with the read of no significant intracranial abnormalities.  Low current concern for a neurodegenerative disorder.  We will continue to monitor clinically.    Patient reports a history of tremor in her left hand that is not present today in clinic.  No parkinsonian features appreciated on exam.  Query drug-induced parkinsonism.    Of note, the patient's last TSH was elevated.  We will recheck today.      Plan:    - TSH, T3, T4      - RTC if patient's memory concerns worsen      Signed:    Tomas Lay MD  Neurology/Epilepsy  08/22/2024 12:33 PM      HPI:  Thiago Rincon is a 58 y.o. female with   Past Medical History:   Diagnosis Date    Abnormal Pap smear of cervix     Alcohol abuse     Allergy     Anxiety     Bipolar disorder     Borderline personality disorder      "Depression     History of psychiatric hospitalization     " a bizillion times"    History of suicidal tendencies     Hx of psychiatric care     Hypertension     Mood disorder     Psoriasis     Psychiatric problem     PTSD (post-traumatic stress disorder)     Schizoaffective disorder     Sleep difficulties     Suicide attempt     "tried to take pills, and slit my wrist. I couldnt tell ya when."    Therapy     Withdrawal symptoms, alcohol     Withdrawal symptoms, drug or narcotic      Patient had a psychosis as a child and a lab came back positive for mercury poisoning (from a coin cleaning kit) at [age] years old.  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      More recently, patient was paranoid and psychotic.  She was then placed on Invega.  Patient is more recently on Invega Sustenna (father administers, a physician, mother is a therapist).  Patient is now off of lithium after a bout of lithium toxicity.  Now on Invega sustenna and trazodone (decreased from 150mg qHs to 50mg qHs).        This is the extent of the patient's complaints at this time.    Per chart review:  "Reports that patient has been diagnosed with schizoaffective disorder and has had numerous hospitalizations in the past. Over past several years she has been stable on Lithium and Invega sustenna. He does not recall doses of these as he is away from home and can't access his records. Her outpatient psychiatrist is Mehran Corea (Cell: [phone]).  At baseline patient is articulate and functions largely normally, lives independently and does volunteer work. Patient usually speaks to aunt several times per week but has been out of touch for 1 week which is unusual. In prior  hospitalizations she has been found appearing catatonic, disorganized. She uses [facility] dispending pharmacy for her MONSIVAIS. MONSIVAIS unknown dose form. Uncle states patient hates/ fears drugs and alcohol so does not use.      She lives alone in house in [facility]. " "Uncle and aunt support her. 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 Pittman [phone]       Review of Systems:  ROS negative unless noted in HPI    Past Surgical History:  Past Surgical History:   Procedure Laterality Date    CERVICAL CONIZATION   W/ LASER      COLONOSCOPY N/A 2017    Procedure: COLONOSCOPY;  Surgeon: Izaiah Bond MD;  Location: [facility];  Service: Endoscopy;  Laterality: N/A;    vocal cord nodules         Family History:  Family History   Problem Relation Name Age of Onset    Hypertension Father      Allergies Father      Bipolar disorder Father      Depression Father      Colon cancer Mother      Hypertension Mother      Allergies Mother      Alcohol abuse Mother      Anxiety disorder Mother      Depression Mother      Cancer Mother      Depression Brother      Drug abuse Brother      Colon cancer Maternal Aunt      Alcohol abuse Maternal Aunt      Depression Maternal Aunt      Bipolar disorder Cousin      Depression Cousin      Breast cancer Neg Hx      Ovarian cancer Neg Hx         Social History:  Social History     Tobacco Use    Smoking status: Every Day     Current packs/day: 0.25     Average packs/day: 0.3 packs/day for 5.0 years (1.3 ttl pk-yrs)     Types: Cigarettes    Smokeless tobacco: Never   Substance Use Topics    Alcohol use: Yes     Comment: I drink every six months socially    Drug use: No       Allergies:  Lamictal [lamotrigine]    Outpatient Medications:  Prior to Admission medications    Medication Sig Start Date End Date Taking? Authorizing Provider   albuterol (PROVENTIL/VENTOLIN HFA) 90 mcg/actuation inhaler SMARTSI Puff(s) By Mouth Twice Daily 21   Provider, Historical   atorvastatin (LIPITOR) 20 MG tablet Take 1 tablet (20 mg total) by mouth once daily. 24   Darrick Franz MD   benztropine (COGENTIN) 1 MG tablet Take 1 tablet (1 mg total) by mouth 2 (two) times daily. 24 " "7/8/25  Frannie Clemente MD   hydrOXYzine (ATARAX) 50 MG tablet Take 1 tablet (50 mg total) by mouth 3 (three) times daily as needed (severe anxiety). 7/8/24   Frannie Clemente MD   OTEZLA 30 mg Tab Take 1 tablet by mouth 2 (two) times daily. 1/31/22   Provider, Historical   paliperidone palmitate (INVEGA SUSTENNA) 117 mg/0.75 mL Syrg injection Inject 0.75 mLs (117 mg total) into the muscle once. for 1 dose 7/8/24 7/8/24  Frannie Clemente MD   propranoloL (INDERAL) 20 MG tablet Take 1 tablet (20 mg total) by mouth 3 (three) times daily. 7/7/24   Darrick Franz MD   traZODone (DESYREL) 150 MG tablet Take 1 tablet (150 mg total) by mouth nightly as needed for Insomnia. 7/7/24   Darrick Franz MD       Physical exam:    Vitals: /67 (BP Location: Left arm, Patient Position: Sitting, BP Method: Medium (Automatic))   Pulse 73   Ht 5' 4" (1.626 m)   Wt 56.1 kg (123 lb 11.2 oz)   LMP  (LMP Unknown)   BMI 21.23 kg/m²     General:   Sitting in chair, in no distress, well-nourished, well-developed, appears stated age.  Head/Neck:   Normocephalic,atraumatic  Pulm:  Non-labored breathing     Mental Status: Alert and oriented to person, time, place, situation. Speech spontaneous and fluent without paraphasias; no dysarthria  CN:  II: visual fields full  III, IV, VI: EOM intact without nystagmus or diplopia.   V: Sensation to light touch full and symmetric in V1-3. [place] contraction full bilaterally.   VII: Facial movement full and symmetric.   VIII: Hearing grossly normal to conversation.  IX, X: Palate midline with symmetric elevation.    XI: SCM and trapezius: 5/5 bilaterally.   XII: Tongue midline without fasciculations.  Motor: Normal bulk and tone throughout all four extremities.   RUE: D: 5/5; B: 5/5; T:  5/5; WF:5/5; WE:  5/5; IO: 5/5   LUE: D: 5/5; B: 5/5; T:  5/5; WF: 5/5; WE:  5/5; IO: 5/5   RLE: HF: 5/5, KE: 5/5, KF: 5/5, DF: 5/5, PF: 5/5  LLE: HF: 5/5, KE: 5/5, KF: 5/5, DF: 5/5, PF: 5/5  No tremors "   Sensory: Intact and symmetric to light touch throughout.  Reflexes: RUE: Triceps 1+, biceps 1+, brachioradialis 1+  LUE: Triceps 1+, biceps 1+ brachioradialis 1+  RLE: Knee 1+, ankle 1+  LLE: Knee 1+, ankle 1+  Coordination:  Intact and symmetric to finger-to-nose and heel-to-shin.  Gait:  Intact to casual gait.    [place] 08/22/2024:               Imaging:  All pertinent imaging was personally reviewed.      I spent a total of 50 minutes on the day of the visit. This includes face to face time and non-face to face time preparing to see the patient (eg, review of tests), obtaining and/or reviewing separately obtained history, documenting clinical information in the electronic or other health record, independently interpreting results and communicating results to the patient/family/caregiver, or care coordinator.

## 2024-08-23 LAB — T4 SERPL-MCNC: 6.5 UG/DL (ref 4.5–11.5)

## 2024-10-23 ENCOUNTER — HOSPITAL ENCOUNTER (EMERGENCY)
Facility: HOSPITAL | Age: 59
Discharge: PSYCHIATRIC OR SUBSTANCE USE FACILITY | End: 2024-10-23
Attending: EMERGENCY MEDICINE
Payer: MEDICARE

## 2024-10-23 VITALS
WEIGHT: 120 LBS | SYSTOLIC BLOOD PRESSURE: 135 MMHG | TEMPERATURE: 98 F | OXYGEN SATURATION: 98 % | DIASTOLIC BLOOD PRESSURE: 82 MMHG | HEIGHT: 64 IN | HEART RATE: 65 BPM | RESPIRATION RATE: 16 BRPM | BODY MASS INDEX: 20.49 KG/M2

## 2024-10-23 DIAGNOSIS — Z00.8 MEDICAL CLEARANCE FOR PSYCHIATRIC ADMISSION: ICD-10-CM

## 2024-10-23 DIAGNOSIS — R45.851 SUICIDAL IDEATION: Primary | ICD-10-CM
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
## 2024-10-23 DIAGNOSIS — N39.0 URINARY TRACT INFECTION WITHOUT HEMATURIA, SITE UNSPECIFIED: ICD-10-CM

## 2024-10-23 LAB
ALBUMIN SERPL BCP-MCNC: 3.6 G/DL (ref 3.5–5.2)
ALP SERPL-CCNC: 75 U/L (ref 40–150)
ALT SERPL W/O P-5'-P-CCNC: 14 U/L (ref 10–44)
AMPHET+METHAMPHET UR QL: NEGATIVE
ANION GAP SERPL CALC-SCNC: 6 MMOL/L (ref 8–16)
APAP SERPL-MCNC: <3 UG/ML (ref 10–20)
AST SERPL-CCNC: 19 U/L (ref 10–40)
BACTERIA #/AREA URNS AUTO: ABNORMAL /HPF
BARBITURATES UR QL SCN>200 NG/ML: NEGATIVE
BASOPHILS # BLD AUTO: 0.04 K/UL (ref 0–0.2)
BASOPHILS NFR BLD: 0.7 % (ref 0–1.9)
BENZODIAZ UR QL SCN>200 NG/ML: NEGATIVE
BILIRUB SERPL-MCNC: 0.2 MG/DL (ref 0.1–1)
BILIRUB UR QL STRIP: NEGATIVE
BUN SERPL-MCNC: 9 MG/DL (ref 6–20)
BZE UR QL SCN: NEGATIVE
CALCIUM SERPL-MCNC: 9.1 MG/DL (ref 8.7–10.5)
CANNABINOIDS UR QL SCN: NEGATIVE
CHLORIDE SERPL-SCNC: 108 MMOL/L (ref 95–110)
CLARITY UR REFRACT.AUTO: CLEAR
CO2 SERPL-SCNC: 24 MMOL/L (ref 23–29)
COLOR UR AUTO: YELLOW
CREAT SERPL-MCNC: 0.7 MG/DL (ref 0.5–1.4)
CREAT UR-MCNC: 47 MG/DL (ref 15–325)
DIFFERENTIAL METHOD BLD: NORMAL
EOSINOPHIL # BLD AUTO: 0.2 K/UL (ref 0–0.5)
EOSINOPHIL NFR BLD: 2.8 % (ref 0–8)
ERYTHROCYTE [DISTWIDTH] IN BLOOD BY AUTOMATED COUNT: 14.4 % (ref 11.5–14.5)
EST. GFR  (NO RACE VARIABLE): >60 ML/MIN/1.73 M^2
ETHANOL SERPL-MCNC: <10 MG/DL
GLUCOSE SERPL-MCNC: 103 MG/DL (ref 70–110)
GLUCOSE UR QL STRIP: NEGATIVE
HCT VFR BLD AUTO: 43 % (ref 37–48.5)
HGB BLD-MCNC: 14 G/DL (ref 12–16)
HGB UR QL STRIP: NEGATIVE
IMM GRANULOCYTES # BLD AUTO: 0.02 K/UL (ref 0–0.04)
IMM GRANULOCYTES NFR BLD AUTO: 0.4 % (ref 0–0.5)
KETONES UR QL STRIP: NEGATIVE
LEUKOCYTE ESTERASE UR QL STRIP: ABNORMAL
LYMPHOCYTES # BLD AUTO: 1.8 K/UL (ref 1–4.8)
LYMPHOCYTES NFR BLD: 31.2 % (ref 18–48)
MCH RBC QN AUTO: 30.1 PG (ref 27–31)
MCHC RBC AUTO-ENTMCNC: 32.6 G/DL (ref 32–36)
MCV RBC AUTO: 93 FL (ref 82–98)
METHADONE UR QL SCN>300 NG/ML: NEGATIVE
MICROSCOPIC COMMENT: ABNORMAL
MONOCYTES # BLD AUTO: 0.5 K/UL (ref 0.3–1)
MONOCYTES NFR BLD: 8.2 % (ref 4–15)
NEUTROPHILS # BLD AUTO: 3.2 K/UL (ref 1.8–7.7)
NEUTROPHILS NFR BLD: 56.7 % (ref 38–73)
NITRITE UR QL STRIP: NEGATIVE
NRBC BLD-RTO: 0 /100 WBC
OPIATES UR QL SCN: NEGATIVE
PCP UR QL SCN>25 NG/ML: NEGATIVE
PH UR STRIP: 7 [PH] (ref 5–8)
PLATELET # BLD AUTO: 275 K/UL (ref 150–450)
PMV BLD AUTO: 9.8 FL (ref 9.2–12.9)
POTASSIUM SERPL-SCNC: 4.6 MMOL/L (ref 3.5–5.1)
PROT SERPL-MCNC: 6.9 G/DL (ref 6–8.4)
PROT UR QL STRIP: NEGATIVE
RBC # BLD AUTO: 4.65 M/UL (ref 4–5.4)
RBC #/AREA URNS AUTO: 1 /HPF (ref 0–4)
SALICYLATES SERPL-MCNC: <5 MG/DL (ref 15–30)
SODIUM SERPL-SCNC: 138 MMOL/L (ref 136–145)
SP GR UR STRIP: 1.01 (ref 1–1.03)
SQUAMOUS #/AREA URNS AUTO: 5 /HPF
TOXICOLOGY INFORMATION: NORMAL
TSH SERPL DL<=0.005 MIU/L-ACNC: 0.82 UIU/ML (ref 0.4–4)
URN SPEC COLLECT METH UR: ABNORMAL
WBC # BLD AUTO: 5.7 K/UL (ref 3.9–12.7)
WBC #/AREA URNS AUTO: 11 /HPF (ref 0–5)

## 2024-10-23 PROCEDURE — 84443 ASSAY THYROID STIM HORMONE: CPT | Performed by: EMERGENCY MEDICINE

## 2024-10-23 PROCEDURE — 80143 DRUG ASSAY ACETAMINOPHEN: CPT | Performed by: EMERGENCY MEDICINE

## 2024-10-23 PROCEDURE — 85025 COMPLETE CBC W/AUTO DIFF WBC: CPT | Performed by: EMERGENCY MEDICINE

## 2024-10-23 PROCEDURE — 80307 DRUG TEST PRSMV CHEM ANLYZR: CPT | Performed by: EMERGENCY MEDICINE

## 2024-10-23 PROCEDURE — 25000003 PHARM REV CODE 250: Performed by: EMERGENCY MEDICINE

## 2024-10-23 PROCEDURE — 81001 URINALYSIS AUTO W/SCOPE: CPT | Mod: XB | Performed by: EMERGENCY MEDICINE

## 2024-10-23 PROCEDURE — 80053 COMPREHEN METABOLIC PANEL: CPT | Performed by: EMERGENCY MEDICINE

## 2024-10-23 PROCEDURE — 80179 DRUG ASSAY SALICYLATE: CPT | Performed by: EMERGENCY MEDICINE

## 2024-10-23 PROCEDURE — 87086 URINE CULTURE/COLONY COUNT: CPT | Performed by: EMERGENCY MEDICINE

## 2024-10-23 PROCEDURE — 82077 ASSAY SPEC XCP UR&BREATH IA: CPT | Performed by: EMERGENCY MEDICINE

## 2024-10-23 PROCEDURE — 99285 EMERGENCY DEPT VISIT HI MDM: CPT | Mod: 25

## 2024-10-23 RX ORDER — CEPHALEXIN 500 MG/1
500 CAPSULE ORAL
Status: COMPLETED | OUTPATIENT
Start: 2024-10-23 | End: 2024-10-23

## 2024-10-23 RX ADMIN — CEPHALEXIN 500 MG: 500 CAPSULE ORAL at 01:10

## 2024-10-24 PROBLEM — S01.21XA NASAL LACERATION: Status: ACTIVE | Noted: 2024-10-24
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
## 2024-10-24 PROBLEM — Z13.9 ENCOUNTER FOR MEDICAL SCREENING EXAMINATION: Status: ACTIVE | Noted: 2017-12-28

## 2024-10-24 PROBLEM — R82.90 ABNORMAL URINALYSIS: Status: ACTIVE | Noted: 2024-10-24

## 2024-10-24 PROBLEM — R63.8 INADEQUATE ORAL INTAKE: Status: ACTIVE | Noted: 2024-10-24

## 2024-10-24 PROBLEM — R52 BODY ACHES: Status: ACTIVE | Noted: 2024-10-24

## 2024-10-24 LAB — BACTERIA UR CULT: NORMAL

## 2025-01-17 ENCOUNTER — HOSPITAL ENCOUNTER (EMERGENCY)
Facility: HOSPITAL | Age: 60
Discharge: PSYCHIATRIC OR SUBSTANCE USE FACILITY | End: 2025-01-17
Attending: STUDENT IN AN ORGANIZED HEALTH CARE EDUCATION/TRAINING PROGRAM
Payer: MEDICARE

## 2025-01-17 VITALS
HEART RATE: 71 BPM | WEIGHT: 130 LBS | OXYGEN SATURATION: 98 % | RESPIRATION RATE: 16 BRPM | HEIGHT: 60 IN | BODY MASS INDEX: 25.52 KG/M2 | SYSTOLIC BLOOD PRESSURE: 103 MMHG | DIASTOLIC BLOOD PRESSURE: 71 MMHG | TEMPERATURE: 98 F

## 2025-01-17 DIAGNOSIS — R21 RASH: ICD-10-CM

## 2025-01-17 DIAGNOSIS — R45.851 SUICIDAL IDEATION: Primary | ICD-10-CM

## 2025-01-17 DIAGNOSIS — R44.3 HALLUCINATIONS: ICD-10-CM

## 2025-01-17 LAB
ALBUMIN SERPL BCP-MCNC: 3.6 G/DL (ref 3.5–5.2)
ALP SERPL-CCNC: 77 U/L (ref 40–150)
ALT SERPL W/O P-5'-P-CCNC: 11 U/L (ref 10–44)
AMPHET+METHAMPHET UR QL: NEGATIVE
ANION GAP SERPL CALC-SCNC: 7 MMOL/L (ref 8–16)
APAP SERPL-MCNC: <3 UG/ML (ref 10–20)
AST SERPL-CCNC: 15 U/L (ref 10–40)
BACTERIA #/AREA URNS AUTO: ABNORMAL /HPF
BARBITURATES UR QL SCN>200 NG/ML: NEGATIVE
BASOPHILS # BLD AUTO: 0.02 K/UL (ref 0–0.2)
BASOPHILS NFR BLD: 0.4 % (ref 0–1.9)
BENZODIAZ UR QL SCN>200 NG/ML: NEGATIVE
BILIRUB SERPL-MCNC: 0.2 MG/DL (ref 0.1–1)
BILIRUB UR QL STRIP: NEGATIVE
BUN SERPL-MCNC: 11 MG/DL (ref 6–20)
BZE UR QL SCN: NEGATIVE
CALCIUM SERPL-MCNC: 8.6 MG/DL (ref 8.7–10.5)
CANNABINOIDS UR QL SCN: NEGATIVE
CHLORIDE SERPL-SCNC: 109 MMOL/L (ref 95–110)
CLARITY UR REFRACT.AUTO: CLEAR
CO2 SERPL-SCNC: 25 MMOL/L (ref 23–29)
COLOR UR AUTO: YELLOW
CREAT SERPL-MCNC: 0.8 MG/DL (ref 0.5–1.4)
CREAT UR-MCNC: 74 MG/DL (ref 15–325)
DIFFERENTIAL METHOD BLD: ABNORMAL
EOSINOPHIL # BLD AUTO: 0.1 K/UL (ref 0–0.5)
EOSINOPHIL NFR BLD: 1.9 % (ref 0–8)
ERYTHROCYTE [DISTWIDTH] IN BLOOD BY AUTOMATED COUNT: 12.7 % (ref 11.5–14.5)
EST. GFR  (NO RACE VARIABLE): >60 ML/MIN/1.73 M^2
ETHANOL SERPL-MCNC: <10 MG/DL
GLUCOSE SERPL-MCNC: 86 MG/DL (ref 70–110)
GLUCOSE UR QL STRIP: NEGATIVE
HCT VFR BLD AUTO: 41.9 % (ref 37–48.5)
HGB BLD-MCNC: 13.3 G/DL (ref 12–16)
HGB UR QL STRIP: NEGATIVE
IMM GRANULOCYTES # BLD AUTO: 0.01 K/UL (ref 0–0.04)
IMM GRANULOCYTES NFR BLD AUTO: 0.2 % (ref 0–0.5)
KETONES UR QL STRIP: NEGATIVE
LEUKOCYTE ESTERASE UR QL STRIP: ABNORMAL
LYMPHOCYTES # BLD AUTO: 1.7 K/UL (ref 1–4.8)
LYMPHOCYTES NFR BLD: 36.4 % (ref 18–48)
MCH RBC QN AUTO: 29.9 PG (ref 27–31)
MCHC RBC AUTO-ENTMCNC: 31.7 G/DL (ref 32–36)
MCV RBC AUTO: 94 FL (ref 82–98)
METHADONE UR QL SCN>300 NG/ML: NEGATIVE
MICROSCOPIC COMMENT: ABNORMAL
MONOCYTES # BLD AUTO: 0.4 K/UL (ref 0.3–1)
MONOCYTES NFR BLD: 9.4 % (ref 4–15)
NEUTROPHILS # BLD AUTO: 2.4 K/UL (ref 1.8–7.7)
NEUTROPHILS NFR BLD: 51.7 % (ref 38–73)
NITRITE UR QL STRIP: NEGATIVE
NRBC BLD-RTO: 0 /100 WBC
OPIATES UR QL SCN: NEGATIVE
PCP UR QL SCN>25 NG/ML: NEGATIVE
PH UR STRIP: 8 [PH] (ref 5–8)
PLATELET # BLD AUTO: 203 K/UL (ref 150–450)
PMV BLD AUTO: 10.4 FL (ref 9.2–12.9)
POTASSIUM SERPL-SCNC: 4 MMOL/L (ref 3.5–5.1)
PROT SERPL-MCNC: 6.6 G/DL (ref 6–8.4)
PROT UR QL STRIP: NEGATIVE
RBC # BLD AUTO: 4.45 M/UL (ref 4–5.4)
RBC #/AREA URNS AUTO: 1 /HPF (ref 0–4)
SALICYLATES SERPL-MCNC: <5 MG/DL (ref 15–30)
SODIUM SERPL-SCNC: 141 MMOL/L (ref 136–145)
SP GR UR STRIP: 1.01 (ref 1–1.03)
SQUAMOUS #/AREA URNS AUTO: 2 /HPF
TOXICOLOGY INFORMATION: NORMAL
URN SPEC COLLECT METH UR: ABNORMAL
WBC # BLD AUTO: 4.7 K/UL (ref 3.9–12.7)
WBC #/AREA URNS AUTO: 53 /HPF (ref 0–5)

## 2025-01-17 PROCEDURE — 80053 COMPREHEN METABOLIC PANEL: CPT | Performed by: STUDENT IN AN ORGANIZED HEALTH CARE EDUCATION/TRAINING PROGRAM

## 2025-01-17 PROCEDURE — 87086 URINE CULTURE/COLONY COUNT: CPT | Performed by: STUDENT IN AN ORGANIZED HEALTH CARE EDUCATION/TRAINING PROGRAM

## 2025-01-17 PROCEDURE — 81001 URINALYSIS AUTO W/SCOPE: CPT | Performed by: STUDENT IN AN ORGANIZED HEALTH CARE EDUCATION/TRAINING PROGRAM

## 2025-01-17 PROCEDURE — 80307 DRUG TEST PRSMV CHEM ANLYZR: CPT | Performed by: STUDENT IN AN ORGANIZED HEALTH CARE EDUCATION/TRAINING PROGRAM

## 2025-01-17 PROCEDURE — 82077 ASSAY SPEC XCP UR&BREATH IA: CPT | Performed by: STUDENT IN AN ORGANIZED HEALTH CARE EDUCATION/TRAINING PROGRAM

## 2025-01-17 PROCEDURE — S4991 NICOTINE PATCH NONLEGEND: HCPCS | Performed by: STUDENT IN AN ORGANIZED HEALTH CARE EDUCATION/TRAINING PROGRAM

## 2025-01-17 PROCEDURE — 85025 COMPLETE CBC W/AUTO DIFF WBC: CPT | Performed by: STUDENT IN AN ORGANIZED HEALTH CARE EDUCATION/TRAINING PROGRAM

## 2025-01-17 PROCEDURE — 80179 DRUG ASSAY SALICYLATE: CPT | Performed by: STUDENT IN AN ORGANIZED HEALTH CARE EDUCATION/TRAINING PROGRAM

## 2025-01-17 PROCEDURE — 99285 EMERGENCY DEPT VISIT HI MDM: CPT

## 2025-01-17 PROCEDURE — 80143 DRUG ASSAY ACETAMINOPHEN: CPT | Performed by: STUDENT IN AN ORGANIZED HEALTH CARE EDUCATION/TRAINING PROGRAM

## 2025-01-17 PROCEDURE — 25000003 PHARM REV CODE 250: Performed by: STUDENT IN AN ORGANIZED HEALTH CARE EDUCATION/TRAINING PROGRAM

## 2025-01-17 RX ORDER — LORAZEPAM 1 MG/1
1 TABLET ORAL
Status: DISCONTINUED | OUTPATIENT
Start: 2025-01-17 | End: 2025-01-17 | Stop reason: HOSPADM

## 2025-01-17 RX ORDER — IBUPROFEN 200 MG
1 TABLET ORAL ONCE
Status: DISCONTINUED | OUTPATIENT
Start: 2025-01-17 | End: 2025-01-17 | Stop reason: HOSPADM

## 2025-01-17 RX ADMIN — Medication 1 PATCH: at 04:01

## 2025-01-17 NOTE — ED NOTES
Pt remains in paper scrubs, resting in stretcher comfortably - with side rails up, locked, and in lowest position. Chest rise and fall noted; breathing equal, even, and unlabored. Sitter remains at bedside in direct visual contact, charting per protocol every 15 minutes. No equipment or belongings are in the patients room to prevent self harm or injury. Pt aware of plan of care. No acute distress noted.

## 2025-01-17 NOTE — ED NOTES
Pt informed of her pending transfer to [facility]. Pt allowed to notify her family of her pending transfer.

## 2025-01-17 NOTE — ED NOTES
Pt belongings: shirt, pants, shoes, socks, jacket, another bag with clothes, and a purse. All secured in the closet.   Security envelope # [id number] with a wallet, $6.00 and cell phone all secured in the safe.    Denies

## 2025-01-17 NOTE — ED TRIAGE NOTES
"Thiago Rincon, a 59 y.o. female presents to the ED w/ complaint of suicidal. Pt arrives to ED via personal vehicle with complaints of suicidal ideation. Pt states that she has a plan to jump in front of a car x1 week. Pt states she has been having issues with her neighbors and her thoughts have been getting worse. Hx of paranoid schizophrenia and bipolar disorder.     Triage note:  Chief Complaint   Patient presents with    Suicidal     Arrived to ED via pov from home with complaint of SI. Pt states that's he wants to jump in front of a car. Pt has hx of paranoid schizophrenia and bipolar disorder and is compliant with medications.      Review of patient's allergies indicates:   Allergen Reactions    Lamictal [lamotrigine] Itching     Past Medical History:   Diagnosis Date    Abnormal Pap smear of cervix     Alcohol abuse     Allergy     Anxiety     Bipolar disorder     Borderline personality disorder     Depression     History of psychiatric hospitalization     " a bizillion times"    History of suicidal tendencies     Hx of psychiatric care     Hypertension     Mood disorder     Psoriasis     Psychiatric problem     PTSD (post-traumatic stress disorder)     Schizoaffective disorder     Sleep difficulties     Suicide attempt     "tried to take pills, and slit my wrist. I couldnt tell ya when."    Therapy     Withdrawal symptoms, alcohol     Withdrawal symptoms, drug or narcotic        "
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
## 2025-01-18 NOTE — ED NOTES
Escorted to & from the restroom where patient voided & returned to bed in a resting position. DVC maintained for safety.

## 2025-01-18 NOTE — ED NOTES
"Patient is recv'd lying supine in bed, awake. She is attired in [facility] provided scrubs w/ fair grooming & hygiene. She is engaging freely. She rates her depression 8/10 & states that she is suicidal " a little bit." She has a formulated plan to "run in the front of a truck." She denies HI, VH. She endorses AH. She is maintained on DVC for safety. Patient is awaiting transfer to [facility].  "

## 2025-01-18 NOTE — ED NOTES
Pt and her belongings being transported to [facility] by ambulance. Pt requested that no one be notified about her departure, she had already made family members and care takers aware of situation.